# Patient Record
Sex: MALE | Race: WHITE | Employment: OTHER | URBAN - METROPOLITAN AREA
[De-identification: names, ages, dates, MRNs, and addresses within clinical notes are randomized per-mention and may not be internally consistent; named-entity substitution may affect disease eponyms.]

---

## 2017-01-20 ENCOUNTER — HOSPITAL ENCOUNTER (OUTPATIENT)
Dept: GENERAL RADIOLOGY | Age: 57
Discharge: HOME | End: 2017-01-20
Attending: INTERNAL MEDICINE | Admitting: INTERNAL MEDICINE

## 2017-01-20 ENCOUNTER — TELEPHONE (OUTPATIENT)
Dept: INTERNAL MEDICINE CLINIC | Age: 57
End: 2017-01-20

## 2017-01-20 DIAGNOSIS — R05.9 COUGH: Primary | ICD-10-CM

## 2017-01-20 DIAGNOSIS — R05.9 COUGH: ICD-10-CM

## 2017-01-23 ENCOUNTER — OFFICE VISIT (OUTPATIENT)
Dept: INTERNAL MEDICINE CLINIC | Age: 57
End: 2017-01-23

## 2017-01-23 VITALS
WEIGHT: 167 LBS | RESPIRATION RATE: 20 BRPM | SYSTOLIC BLOOD PRESSURE: 114 MMHG | BODY MASS INDEX: 28.67 KG/M2 | DIASTOLIC BLOOD PRESSURE: 64 MMHG | HEART RATE: 77 BPM | TEMPERATURE: 97.2 F | OXYGEN SATURATION: 97 %

## 2017-01-23 DIAGNOSIS — I71.21 ASCENDING AORTIC ANEURYSM: ICD-10-CM

## 2017-01-23 DIAGNOSIS — J20.9 ACUTE BRONCHITIS, UNSPECIFIED ORGANISM: Primary | ICD-10-CM

## 2017-01-23 PROCEDURE — 99213 OFFICE O/P EST LOW 20 MIN: CPT | Performed by: INTERNAL MEDICINE

## 2017-01-23 PROCEDURE — G8484 FLU IMMUNIZE NO ADMIN: HCPCS | Performed by: INTERNAL MEDICINE

## 2017-01-23 PROCEDURE — 3017F COLORECTAL CA SCREEN DOC REV: CPT | Performed by: INTERNAL MEDICINE

## 2017-01-23 PROCEDURE — 1036F TOBACCO NON-USER: CPT | Performed by: INTERNAL MEDICINE

## 2017-01-23 PROCEDURE — G8427 DOCREV CUR MEDS BY ELIG CLIN: HCPCS | Performed by: INTERNAL MEDICINE

## 2017-01-23 PROCEDURE — G8419 CALC BMI OUT NRM PARAM NOF/U: HCPCS | Performed by: INTERNAL MEDICINE

## 2017-01-23 RX ORDER — AZITHROMYCIN 250 MG/1
TABLET, FILM COATED ORAL
Qty: 1 PACKET | Refills: 0 | Status: SHIPPED | OUTPATIENT
Start: 2017-01-23 | End: 2017-02-08 | Stop reason: ALTCHOICE

## 2017-02-15 ENCOUNTER — HOSPITAL ENCOUNTER (OUTPATIENT)
Dept: LAB | Age: 57
Discharge: HOME | End: 2017-02-15
Attending: INTERNAL MEDICINE | Admitting: INTERNAL MEDICINE

## 2017-02-15 LAB
ALBUMIN SERPL-MCNC: 4.2 GM/DL (ref 3.4–5)
ALP BLD-CCNC: 85 IU/L (ref 40–129)
ALT SERPL-CCNC: 12 U/L (ref 10–40)
ANION GAP SERPL CALCULATED.3IONS-SCNC: 13 MMOL/L (ref 4–16)
AST SERPL-CCNC: 13 IU/L (ref 15–37)
BILIRUB SERPL-MCNC: 0.4 MG/DL (ref 0–1)
BUN BLDV-MCNC: 17 MG/DL (ref 6–23)
CALCIUM SERPL-MCNC: 9.4 MG/DL (ref 8.3–10.6)
CHLORIDE BLD-SCNC: 102 MMOL/L (ref 99–110)
CHOLESTEROL: 135 MG/DL
CO2: 26 MMOL/L (ref 21–32)
CREAT SERPL-MCNC: 1.1 MG/DL (ref 0.9–1.3)
ESTIMATED AVERAGE GLUCOSE: 126 MG/DL
GFR AFRICAN AMERICAN: >60 ML/MIN/1.73M2
GFR NON-AFRICAN AMERICAN: >60 ML/MIN/1.73M2
GLUCOSE FASTING: 101 MG/DL (ref 70–99)
HBA1C MFR BLD: 6 % (ref 4.2–6.3)
HDLC SERPL-MCNC: 29 MG/DL
LDL CHOLESTEROL DIRECT: 82 MG/DL
POTASSIUM SERPL-SCNC: 4.3 MMOL/L (ref 3.5–5.1)
SODIUM BLD-SCNC: 141 MMOL/L (ref 135–145)
TOTAL PROTEIN: 6.9 GM/DL (ref 6.4–8.2)
TRIGL SERPL-MCNC: 205 MG/DL

## 2017-02-27 ENCOUNTER — OFFICE VISIT (OUTPATIENT)
Dept: INTERNAL MEDICINE CLINIC | Age: 57
End: 2017-02-27

## 2017-02-27 VITALS
TEMPERATURE: 98.2 F | OXYGEN SATURATION: 98 % | BODY MASS INDEX: 27.79 KG/M2 | SYSTOLIC BLOOD PRESSURE: 106 MMHG | RESPIRATION RATE: 13 BRPM | HEART RATE: 64 BPM | HEIGHT: 64 IN | WEIGHT: 162.8 LBS | DIASTOLIC BLOOD PRESSURE: 62 MMHG

## 2017-02-27 DIAGNOSIS — I71.21 ASCENDING AORTIC ANEURYSM: ICD-10-CM

## 2017-02-27 DIAGNOSIS — K76.89 LIVER DYSFUNCTION: ICD-10-CM

## 2017-02-27 DIAGNOSIS — F41.9 ANXIETY: ICD-10-CM

## 2017-02-27 DIAGNOSIS — R91.8 MULTIPLE LUNG NODULES ON CT: ICD-10-CM

## 2017-02-27 DIAGNOSIS — E11.9 TYPE 2 DIABETES MELLITUS WITHOUT COMPLICATION, WITHOUT LONG-TERM CURRENT USE OF INSULIN (HCC): ICD-10-CM

## 2017-02-27 DIAGNOSIS — E78.2 MIXED HYPERLIPIDEMIA: ICD-10-CM

## 2017-02-27 DIAGNOSIS — M19.90 OSTEOARTHRITIS, UNSPECIFIED OSTEOARTHRITIS TYPE, UNSPECIFIED SITE: ICD-10-CM

## 2017-02-27 DIAGNOSIS — I10 ESSENTIAL HYPERTENSION: ICD-10-CM

## 2017-02-27 PROCEDURE — G8419 CALC BMI OUT NRM PARAM NOF/U: HCPCS | Performed by: INTERNAL MEDICINE

## 2017-02-27 PROCEDURE — 3017F COLORECTAL CA SCREEN DOC REV: CPT | Performed by: INTERNAL MEDICINE

## 2017-02-27 PROCEDURE — G8484 FLU IMMUNIZE NO ADMIN: HCPCS | Performed by: INTERNAL MEDICINE

## 2017-02-27 PROCEDURE — 99214 OFFICE O/P EST MOD 30 MIN: CPT | Performed by: INTERNAL MEDICINE

## 2017-02-27 PROCEDURE — G8427 DOCREV CUR MEDS BY ELIG CLIN: HCPCS | Performed by: INTERNAL MEDICINE

## 2017-02-27 PROCEDURE — 3044F HG A1C LEVEL LT 7.0%: CPT | Performed by: INTERNAL MEDICINE

## 2017-02-27 PROCEDURE — 1036F TOBACCO NON-USER: CPT | Performed by: INTERNAL MEDICINE

## 2017-02-27 RX ORDER — GLIPIZIDE 5 MG/1
5 TABLET, FILM COATED, EXTENDED RELEASE ORAL DAILY
Qty: 90 TABLET | Refills: 2 | Status: SHIPPED | OUTPATIENT
Start: 2017-02-27 | End: 2017-06-01 | Stop reason: ALTCHOICE

## 2017-02-27 RX ORDER — OMEPRAZOLE 20 MG/1
20 CAPSULE, DELAYED RELEASE ORAL DAILY
Qty: 90 CAPSULE | Refills: 1 | Status: SHIPPED | OUTPATIENT
Start: 2017-02-27 | End: 2017-06-01 | Stop reason: ALTCHOICE

## 2017-02-27 RX ORDER — CARVEDILOL 12.5 MG/1
12.5 TABLET ORAL 2 TIMES DAILY WITH MEALS
Qty: 180 TABLET | Refills: 1 | Status: SHIPPED | OUTPATIENT
Start: 2017-02-27 | End: 2017-06-01 | Stop reason: SDUPTHER

## 2017-02-27 RX ORDER — AMLODIPINE BESYLATE 10 MG/1
TABLET ORAL
Qty: 90 TABLET | Refills: 1 | Status: SHIPPED | OUTPATIENT
Start: 2017-02-27 | End: 2017-06-01 | Stop reason: SDUPTHER

## 2017-02-27 RX ORDER — BENAZEPRIL HYDROCHLORIDE 20 MG/1
TABLET ORAL
Qty: 90 TABLET | Refills: 1 | Status: SHIPPED | OUTPATIENT
Start: 2017-02-27 | End: 2017-06-01 | Stop reason: SDUPTHER

## 2017-02-27 RX ORDER — ATORVASTATIN CALCIUM 40 MG/1
40 TABLET, FILM COATED ORAL DAILY
Qty: 90 TABLET | Refills: 1 | Status: SHIPPED | OUTPATIENT
Start: 2017-02-27 | End: 2017-06-01 | Stop reason: SDUPTHER

## 2017-02-27 ASSESSMENT — ENCOUNTER SYMPTOMS
COUGH: 0
HEARTBURN: 0
VOMITING: 0
EYES NEGATIVE: 1
SHORTNESS OF BREATH: 0
BLOOD IN STOOL: 0
GASTROINTESTINAL NEGATIVE: 1
NAUSEA: 0
RESPIRATORY NEGATIVE: 1

## 2017-06-01 ENCOUNTER — OFFICE VISIT (OUTPATIENT)
Dept: INTERNAL MEDICINE CLINIC | Age: 57
End: 2017-06-01

## 2017-06-01 VITALS
HEART RATE: 60 BPM | RESPIRATION RATE: 12 BRPM | TEMPERATURE: 97.7 F | BODY MASS INDEX: 28.41 KG/M2 | HEIGHT: 64 IN | OXYGEN SATURATION: 98 % | SYSTOLIC BLOOD PRESSURE: 108 MMHG | DIASTOLIC BLOOD PRESSURE: 66 MMHG | WEIGHT: 166.4 LBS

## 2017-06-01 DIAGNOSIS — M19.90 OSTEOARTHRITIS, UNSPECIFIED OSTEOARTHRITIS TYPE, UNSPECIFIED SITE: ICD-10-CM

## 2017-06-01 DIAGNOSIS — E78.2 MIXED HYPERLIPIDEMIA: ICD-10-CM

## 2017-06-01 DIAGNOSIS — A48.1 LEGIONELLA PNEUMONIA (HCC): ICD-10-CM

## 2017-06-01 DIAGNOSIS — F41.9 ANXIETY: ICD-10-CM

## 2017-06-01 DIAGNOSIS — I10 ESSENTIAL HYPERTENSION: ICD-10-CM

## 2017-06-01 DIAGNOSIS — E11.9 TYPE 2 DIABETES MELLITUS WITHOUT COMPLICATION, WITHOUT LONG-TERM CURRENT USE OF INSULIN (HCC): Primary | ICD-10-CM

## 2017-06-01 DIAGNOSIS — I71.21 ASCENDING AORTIC ANEURYSM: ICD-10-CM

## 2017-06-01 DIAGNOSIS — R91.8 MULTIPLE LUNG NODULES ON CT: ICD-10-CM

## 2017-06-01 LAB — HBA1C MFR BLD: 5.7 %

## 2017-06-01 PROCEDURE — G8427 DOCREV CUR MEDS BY ELIG CLIN: HCPCS | Performed by: INTERNAL MEDICINE

## 2017-06-01 PROCEDURE — 99214 OFFICE O/P EST MOD 30 MIN: CPT | Performed by: INTERNAL MEDICINE

## 2017-06-01 PROCEDURE — 1036F TOBACCO NON-USER: CPT | Performed by: INTERNAL MEDICINE

## 2017-06-01 PROCEDURE — 83036 HEMOGLOBIN GLYCOSYLATED A1C: CPT | Performed by: INTERNAL MEDICINE

## 2017-06-01 PROCEDURE — 3044F HG A1C LEVEL LT 7.0%: CPT | Performed by: INTERNAL MEDICINE

## 2017-06-01 PROCEDURE — G8419 CALC BMI OUT NRM PARAM NOF/U: HCPCS | Performed by: INTERNAL MEDICINE

## 2017-06-01 PROCEDURE — 3017F COLORECTAL CA SCREEN DOC REV: CPT | Performed by: INTERNAL MEDICINE

## 2017-06-01 RX ORDER — OMEPRAZOLE 20 MG/1
20 CAPSULE, DELAYED RELEASE ORAL DAILY
Qty: 90 CAPSULE | Refills: 1 | Status: CANCELLED | OUTPATIENT
Start: 2017-06-01

## 2017-06-01 RX ORDER — ATORVASTATIN CALCIUM 40 MG/1
40 TABLET, FILM COATED ORAL DAILY
Qty: 90 TABLET | Refills: 1 | Status: SHIPPED | OUTPATIENT
Start: 2017-06-01 | End: 2017-09-13 | Stop reason: SDUPTHER

## 2017-06-01 RX ORDER — GLIPIZIDE 5 MG/1
5 TABLET, FILM COATED, EXTENDED RELEASE ORAL DAILY
Qty: 90 TABLET | Refills: 2 | Status: CANCELLED | OUTPATIENT
Start: 2017-06-01

## 2017-06-01 RX ORDER — BENAZEPRIL HYDROCHLORIDE 20 MG/1
TABLET ORAL
Qty: 90 TABLET | Refills: 1 | Status: SHIPPED | OUTPATIENT
Start: 2017-06-01 | End: 2017-09-13 | Stop reason: SDUPTHER

## 2017-06-01 RX ORDER — CARVEDILOL 12.5 MG/1
12.5 TABLET ORAL 2 TIMES DAILY WITH MEALS
Qty: 180 TABLET | Refills: 1 | Status: SHIPPED | OUTPATIENT
Start: 2017-06-01 | End: 2017-09-13 | Stop reason: SDUPTHER

## 2017-06-01 RX ORDER — AMLODIPINE BESYLATE 10 MG/1
TABLET ORAL
Qty: 90 TABLET | Refills: 1 | Status: SHIPPED | OUTPATIENT
Start: 2017-06-01 | End: 2017-09-13 | Stop reason: SDUPTHER

## 2017-06-01 ASSESSMENT — ENCOUNTER SYMPTOMS
GASTROINTESTINAL NEGATIVE: 1
SHORTNESS OF BREATH: 0
EYES NEGATIVE: 1
RESPIRATORY NEGATIVE: 1
COUGH: 0

## 2017-06-01 ASSESSMENT — PATIENT HEALTH QUESTIONNAIRE - PHQ9
SUM OF ALL RESPONSES TO PHQ9 QUESTIONS 1 & 2: 1
SUM OF ALL RESPONSES TO PHQ QUESTIONS 1-9: 1
2. FEELING DOWN, DEPRESSED OR HOPELESS: 0
1. LITTLE INTEREST OR PLEASURE IN DOING THINGS: 1

## 2017-09-06 ENCOUNTER — HOSPITAL ENCOUNTER (OUTPATIENT)
Dept: LAB | Age: 57
Discharge: HOME | End: 2017-09-06
Attending: INTERNAL MEDICINE | Admitting: INTERNAL MEDICINE

## 2017-09-06 LAB
ALBUMIN SERPL-MCNC: 4.5 GM/DL (ref 3.4–5)
ALP BLD-CCNC: 79 IU/L (ref 40–129)
ALT SERPL-CCNC: 10 U/L (ref 10–40)
ANION GAP SERPL CALCULATED.3IONS-SCNC: 13 MMOL/L (ref 4–16)
AST SERPL-CCNC: 13 IU/L (ref 15–37)
BILIRUB SERPL-MCNC: 0.5 MG/DL (ref 0–1)
BUN BLDV-MCNC: 17 MG/DL (ref 6–23)
CALCIUM SERPL-MCNC: 9.3 MG/DL (ref 8.3–10.6)
CHLORIDE BLD-SCNC: 102 MMOL/L (ref 99–110)
CHOLESTEROL, FASTING: 137 MG/DL
CO2: 28 MMOL/L (ref 21–32)
CREAT SERPL-MCNC: 1 MG/DL (ref 0.9–1.3)
CREATININE URINE: 160.5 MG/DL (ref 39–259)
GFR AFRICAN AMERICAN: >60 ML/MIN/1.73M2
GFR NON-AFRICAN AMERICAN: >60 ML/MIN/1.73M2
GLUCOSE FASTING: 134 MG/DL (ref 70–99)
HDLC SERPL-MCNC: 32 MG/DL
LDL CHOLESTEROL DIRECT: 85 MG/DL
MICROALBUMIN/CREAT 24H UR: 1.3 MG/DL
MICROALBUMIN/CREAT UR-RTO: 8.1 MG/G CREAT (ref 0–30)
POTASSIUM SERPL-SCNC: 4.5 MMOL/L (ref 3.5–5.1)
SODIUM BLD-SCNC: 143 MMOL/L (ref 135–145)
TOTAL PROTEIN: 7.3 GM/DL (ref 6.4–8.2)
TRIGLYCERIDE, FASTING: 116 MG/DL

## 2017-09-13 ENCOUNTER — OFFICE VISIT (OUTPATIENT)
Dept: INTERNAL MEDICINE CLINIC | Age: 57
End: 2017-09-13

## 2017-09-13 VITALS
TEMPERATURE: 98 F | BODY MASS INDEX: 28.07 KG/M2 | HEART RATE: 72 BPM | SYSTOLIC BLOOD PRESSURE: 112 MMHG | HEIGHT: 64 IN | OXYGEN SATURATION: 98 % | WEIGHT: 164.4 LBS | DIASTOLIC BLOOD PRESSURE: 62 MMHG | RESPIRATION RATE: 16 BRPM

## 2017-09-13 DIAGNOSIS — E78.2 MIXED HYPERLIPIDEMIA: ICD-10-CM

## 2017-09-13 DIAGNOSIS — I10 ESSENTIAL HYPERTENSION: ICD-10-CM

## 2017-09-13 DIAGNOSIS — M19.90 OSTEOARTHRITIS, UNSPECIFIED OSTEOARTHRITIS TYPE, UNSPECIFIED SITE: ICD-10-CM

## 2017-09-13 DIAGNOSIS — E11.9 TYPE 2 DIABETES MELLITUS WITHOUT COMPLICATION, WITHOUT LONG-TERM CURRENT USE OF INSULIN (HCC): ICD-10-CM

## 2017-09-13 DIAGNOSIS — R91.8 MULTIPLE LUNG NODULES ON CT: ICD-10-CM

## 2017-09-13 DIAGNOSIS — Z00.00 HEALTHCARE MAINTENANCE: ICD-10-CM

## 2017-09-13 DIAGNOSIS — F41.9 ANXIETY: ICD-10-CM

## 2017-09-13 DIAGNOSIS — I71.21 ASCENDING AORTIC ANEURYSM: ICD-10-CM

## 2017-09-13 PROCEDURE — G8427 DOCREV CUR MEDS BY ELIG CLIN: HCPCS | Performed by: INTERNAL MEDICINE

## 2017-09-13 PROCEDURE — 3046F HEMOGLOBIN A1C LEVEL >9.0%: CPT | Performed by: INTERNAL MEDICINE

## 2017-09-13 PROCEDURE — G8417 CALC BMI ABV UP PARAM F/U: HCPCS | Performed by: INTERNAL MEDICINE

## 2017-09-13 PROCEDURE — 3017F COLORECTAL CA SCREEN DOC REV: CPT | Performed by: INTERNAL MEDICINE

## 2017-09-13 PROCEDURE — 99214 OFFICE O/P EST MOD 30 MIN: CPT | Performed by: INTERNAL MEDICINE

## 2017-09-13 PROCEDURE — 1036F TOBACCO NON-USER: CPT | Performed by: INTERNAL MEDICINE

## 2017-09-13 RX ORDER — ATORVASTATIN CALCIUM 40 MG/1
40 TABLET, FILM COATED ORAL DAILY
Qty: 90 TABLET | Refills: 1 | Status: SHIPPED | OUTPATIENT
Start: 2017-09-13 | End: 2018-07-23 | Stop reason: SDUPTHER

## 2017-09-13 RX ORDER — BENAZEPRIL HYDROCHLORIDE 20 MG/1
TABLET ORAL
Qty: 90 TABLET | Refills: 1 | Status: SHIPPED | OUTPATIENT
Start: 2017-09-13 | End: 2018-07-23 | Stop reason: SDUPTHER

## 2017-09-13 RX ORDER — AMLODIPINE BESYLATE 10 MG/1
TABLET ORAL
Qty: 90 TABLET | Refills: 1 | Status: SHIPPED | OUTPATIENT
Start: 2017-09-13 | End: 2018-07-23 | Stop reason: SDUPTHER

## 2017-09-13 RX ORDER — CARVEDILOL 12.5 MG/1
12.5 TABLET ORAL 2 TIMES DAILY WITH MEALS
Qty: 180 TABLET | Refills: 1 | Status: SHIPPED | OUTPATIENT
Start: 2017-09-13 | End: 2017-11-27 | Stop reason: SDUPTHER

## 2017-09-13 RX ORDER — GLUCOSAMINE HCL/CHONDROITIN SU 500-400 MG
CAPSULE ORAL
Qty: 100 STRIP | Refills: 1 | Status: SHIPPED | OUTPATIENT
Start: 2017-09-13 | End: 2018-09-26 | Stop reason: SDUPTHER

## 2017-09-13 ASSESSMENT — ENCOUNTER SYMPTOMS
RESPIRATORY NEGATIVE: 1
SPUTUM PRODUCTION: 0
GASTROINTESTINAL NEGATIVE: 1
COUGH: 0
HEMOPTYSIS: 0

## 2017-09-16 ENCOUNTER — HOSPITAL ENCOUNTER (OUTPATIENT)
Dept: CT IMAGING | Age: 57
Discharge: HOME | End: 2017-09-16
Attending: INTERNAL MEDICINE | Admitting: INTERNAL MEDICINE

## 2017-09-16 DIAGNOSIS — R91.8 MULTIPLE LUNG NODULES: ICD-10-CM

## 2017-09-16 DIAGNOSIS — I71.20 THORACIC AORTIC ANEURYSM WITHOUT RUPTURE: ICD-10-CM

## 2017-09-21 ENCOUNTER — NURSE ONLY (OUTPATIENT)
Dept: INTERNAL MEDICINE CLINIC | Age: 57
End: 2017-09-21

## 2017-09-21 DIAGNOSIS — Z00.00 HEALTHCARE MAINTENANCE: Primary | ICD-10-CM

## 2017-09-21 LAB
CONTROL: NORMAL
HEMOCCULT STL QL: NEGATIVE

## 2017-09-21 PROCEDURE — 82274 ASSAY TEST FOR BLOOD FECAL: CPT | Performed by: INTERNAL MEDICINE

## 2017-11-27 RX ORDER — CARVEDILOL 12.5 MG/1
TABLET ORAL
Qty: 180 TABLET | Refills: 1 | Status: SHIPPED | OUTPATIENT
Start: 2017-11-27 | End: 2018-07-23 | Stop reason: SDUPTHER

## 2017-12-14 ENCOUNTER — OFFICE VISIT (OUTPATIENT)
Dept: INTERNAL MEDICINE CLINIC | Age: 57
End: 2017-12-14

## 2017-12-14 VITALS
OXYGEN SATURATION: 98 % | RESPIRATION RATE: 12 BRPM | DIASTOLIC BLOOD PRESSURE: 72 MMHG | HEART RATE: 60 BPM | SYSTOLIC BLOOD PRESSURE: 122 MMHG | WEIGHT: 166 LBS | BODY MASS INDEX: 28.49 KG/M2 | TEMPERATURE: 97.2 F

## 2017-12-14 DIAGNOSIS — I10 ESSENTIAL HYPERTENSION: ICD-10-CM

## 2017-12-14 DIAGNOSIS — E78.2 MIXED HYPERLIPIDEMIA: ICD-10-CM

## 2017-12-14 DIAGNOSIS — M19.90 OSTEOARTHRITIS, UNSPECIFIED OSTEOARTHRITIS TYPE, UNSPECIFIED SITE: ICD-10-CM

## 2017-12-14 DIAGNOSIS — R91.8 MULTIPLE LUNG NODULES ON CT: ICD-10-CM

## 2017-12-14 DIAGNOSIS — I71.21 ASCENDING AORTIC ANEURYSM: ICD-10-CM

## 2017-12-14 DIAGNOSIS — F41.9 ANXIETY: ICD-10-CM

## 2017-12-14 DIAGNOSIS — Z00.00 HEALTHCARE MAINTENANCE: ICD-10-CM

## 2017-12-14 DIAGNOSIS — E11.9 TYPE 2 DIABETES MELLITUS WITHOUT COMPLICATION, WITHOUT LONG-TERM CURRENT USE OF INSULIN (HCC): Primary | ICD-10-CM

## 2017-12-14 LAB — HBA1C MFR BLD: 6.1 %

## 2017-12-14 PROCEDURE — 83036 HEMOGLOBIN GLYCOSYLATED A1C: CPT | Performed by: INTERNAL MEDICINE

## 2017-12-14 PROCEDURE — 99214 OFFICE O/P EST MOD 30 MIN: CPT | Performed by: INTERNAL MEDICINE

## 2017-12-14 ASSESSMENT — ENCOUNTER SYMPTOMS
DOUBLE VISION: 0
HEARTBURN: 0
EYES NEGATIVE: 1
GASTROINTESTINAL NEGATIVE: 1
BLURRED VISION: 0
RESPIRATORY NEGATIVE: 1
NAUSEA: 0

## 2017-12-14 NOTE — ASSESSMENT & PLAN NOTE
Small 2-4 mm nodules on CT in 10/16. F/u in one year. Seen Dr Nima Gomez. F/u 9/2017 : stable nodules. Benign based on stability criteria. No f/u needed  Does not want to see Dr Nima Gomez because of insurance problems.

## 2017-12-14 NOTE — ASSESSMENT & PLAN NOTE
Hypertension in control  Patient is on amlodipine, benazepril and carvedilol  Patient is stable. Continue current treatment.

## 2017-12-14 NOTE — PROGRESS NOTES
 Efudex [Fluorouracil]      Current Outpatient Prescriptions   Medication Sig Dispense Refill    carvedilol (COREG) 12.5 MG tablet TAKE ONE TABLET BY MOUTH TWICE DAILY with meals 180 tablet 1    amLODIPine (NORVASC) 10 MG tablet TAKE 1 TABLET BY MOUTH EVERY DAY 90 tablet 1    benazepril (LOTENSIN) 20 MG tablet TAKE 1 TABLET BY MOUTH EVERY DAY 90 tablet 1    sitaGLIPtan-metformin (JANUMET)  MG per tablet Take 1 tablet by mouth 2 times daily (with meals) 180 tablet 1    atorvastatin (LIPITOR) 40 MG tablet Take 1 tablet by mouth daily 90 tablet 1    ONE TOUCH LANCETS MISC Testing once daily, DX=E11.9 100 each 1    Glucose Blood (BLOOD GLUCOSE TEST STRIPS) STRP Please dispense One Touch Test strips, testing once daily, DX=E11.9 100 strip 1    Dextrose, Diabetic Use, (GLUTOSE 15 PO) Take 1 tablet by mouth as needed Chewable fruit flavored unsure of dosing      aspirin 81 MG tablet Take 81 mg by mouth daily. No current facility-administered medications for this visit. Past Medical History:   Diagnosis Date    Adhesive capsulitis of shoulder 4/23/2011    Anxiety     h/o anxiety workman comp related. sees a psychologist    Ascending aortic aneurysm ([facility] [address] 9/9/2016 8/16: CT chest :Mild aneurysmal dilation of the ascending aorta measuring up to 4.1 cm. Recheck in one year    Backache, unspecified 7/24/2013    Sees dr. Gerry Schmitz    Chronic back pain     Colonoscopy refused     Depression     Diabetes mellitus ([facility] [address]     ED (erectile dysfunction) 1/25/2014    H/O CHF 2003    resolved  EF 25-30 % improved 60% in 3/05    H/O echocardiogram 2011    EF 60 %    Hyperlipidemia     Hypertension     Legionella pneumonia ([facility] 10/27/2016    10/16. Resolved. Seen Shirley Ribeiro.  Limitation due to disability     due to anxiety and agrophobia    Liver dysfunction     blood w/u neg.  US fatty liver    Liver dysfunction 10/27/2016    Liver test were abnormal with legionelle pneumonia Now

## 2017-12-14 NOTE — ASSESSMENT & PLAN NOTE
Pt has DM since ? 2007  Sees Ophthalmologist once a year.  Dr Martinez Guru  change to Janumet 50/1000 mg bid  BS are good at home

## 2017-12-14 NOTE — ASSESSMENT & PLAN NOTE
8/16: CT chest :Mild aneurysmal dilation of the ascending aorta measuring up to 4.1 cm.  9/16: stable.  Ectasia 4.1 cm  Recheck in one year

## 2018-03-07 ENCOUNTER — HOSPITAL ENCOUNTER (OUTPATIENT)
Dept: LAB | Age: 58
Discharge: HOME | End: 2018-03-07
Attending: INTERNAL MEDICINE | Admitting: INTERNAL MEDICINE

## 2018-03-07 LAB
ALBUMIN SERPL-MCNC: 4.7 GM/DL (ref 3.4–5)
ALP BLD-CCNC: 74 IU/L (ref 40–129)
ALT SERPL-CCNC: 22 U/L (ref 10–40)
ANION GAP SERPL CALCULATED.3IONS-SCNC: 11 MMOL/L (ref 4–16)
AST SERPL-CCNC: 15 IU/L (ref 15–37)
BASOPHILS ABSOLUTE: 0 K/CU MM
BASOPHILS RELATIVE PERCENT: 0.6 % (ref 0–1)
BILIRUB SERPL-MCNC: 0.5 MG/DL (ref 0–1)
BUN BLDV-MCNC: 14 MG/DL (ref 6–23)
CALCIUM SERPL-MCNC: 9.9 MG/DL (ref 8.3–10.6)
CHLORIDE BLD-SCNC: 100 MMOL/L (ref 99–110)
CHOLESTEROL, FASTING: 176 MG/DL
CO2: 30 MMOL/L (ref 21–32)
CREAT SERPL-MCNC: 1 MG/DL (ref 0.9–1.3)
DIFFERENTIAL TYPE: ABNORMAL
EOSINOPHILS ABSOLUTE: 0.2 K/CU MM
EOSINOPHILS RELATIVE PERCENT: 3.4 % (ref 0–3)
GFR AFRICAN AMERICAN: >60 ML/MIN/1.73M2
GFR NON-AFRICAN AMERICAN: >60 ML/MIN/1.73M2
GLUCOSE FASTING: 177 MG/DL (ref 70–99)
HCT VFR BLD CALC: 45.1 % (ref 42–52)
HDLC SERPL-MCNC: 39 MG/DL
HEMOGLOBIN: 14.9 GM/DL (ref 13.5–18)
IMMATURE NEUTROPHIL %: 0.3 % (ref 0–0.43)
LDL CHOLESTEROL DIRECT: 107 MG/DL
LYMPHOCYTES ABSOLUTE: 1.6 K/CU MM
LYMPHOCYTES RELATIVE PERCENT: 23.2 % (ref 24–44)
MCH RBC QN AUTO: 30 PG (ref 27–31)
MCHC RBC AUTO-ENTMCNC: 33 % (ref 32–36)
MCV RBC AUTO: 90.9 FL (ref 78–100)
MONOCYTES ABSOLUTE: 0.7 K/CU MM
MONOCYTES RELATIVE PERCENT: 10.2 % (ref 0–4)
PDW BLD-RTO: 12.7 % (ref 11.7–14.9)
PLATELET # BLD: 311 K/CU MM (ref 140–440)
PMV BLD AUTO: 10.6 FL (ref 7.5–11.1)
POTASSIUM SERPL-SCNC: 5.3 MMOL/L (ref 3.5–5.1)
RBC # BLD: 4.96 M/CU MM (ref 4.6–6.2)
SEGMENTED NEUTROPHILS ABSOLUTE COUNT: 4.4 K/CU MM
SEGMENTED NEUTROPHILS RELATIVE PERCENT: 62.3 % (ref 36–66)
SODIUM BLD-SCNC: 141 MMOL/L (ref 135–145)
TOTAL IMMATURE NEUTOROPHIL: 0.02 K/CU MM
TOTAL PROTEIN: 7.7 GM/DL (ref 6.4–8.2)
TRIGLYCERIDE, FASTING: 205 MG/DL
WBC # BLD: 7 K/CU MM (ref 4–10.5)

## 2018-03-15 ENCOUNTER — OFFICE VISIT (OUTPATIENT)
Dept: INTERNAL MEDICINE CLINIC | Age: 58
End: 2018-03-15

## 2018-03-15 VITALS
BODY MASS INDEX: 28.58 KG/M2 | DIASTOLIC BLOOD PRESSURE: 80 MMHG | HEART RATE: 72 BPM | SYSTOLIC BLOOD PRESSURE: 128 MMHG | RESPIRATION RATE: 12 BRPM | OXYGEN SATURATION: 96 % | HEIGHT: 64 IN | WEIGHT: 167.4 LBS | TEMPERATURE: 97.7 F

## 2018-03-15 DIAGNOSIS — E78.2 MIXED HYPERLIPIDEMIA: ICD-10-CM

## 2018-03-15 DIAGNOSIS — I10 ESSENTIAL HYPERTENSION: ICD-10-CM

## 2018-03-15 DIAGNOSIS — E11.9 TYPE 2 DIABETES MELLITUS WITHOUT COMPLICATION, WITHOUT LONG-TERM CURRENT USE OF INSULIN (HCC): Primary | ICD-10-CM

## 2018-03-15 DIAGNOSIS — I71.21 ASCENDING AORTIC ANEURYSM: ICD-10-CM

## 2018-03-15 DIAGNOSIS — R91.8 MULTIPLE LUNG NODULES ON CT: ICD-10-CM

## 2018-03-15 DIAGNOSIS — F41.9 ANXIETY: ICD-10-CM

## 2018-03-15 DIAGNOSIS — Z00.00 HEALTHCARE MAINTENANCE: ICD-10-CM

## 2018-03-15 DIAGNOSIS — M19.90 OSTEOARTHRITIS, UNSPECIFIED OSTEOARTHRITIS TYPE, UNSPECIFIED SITE: ICD-10-CM

## 2018-03-15 LAB — HBA1C MFR BLD: 6 %

## 2018-03-15 PROCEDURE — 3017F COLORECTAL CA SCREEN DOC REV: CPT | Performed by: INTERNAL MEDICINE

## 2018-03-15 PROCEDURE — 1036F TOBACCO NON-USER: CPT | Performed by: INTERNAL MEDICINE

## 2018-03-15 PROCEDURE — G8427 DOCREV CUR MEDS BY ELIG CLIN: HCPCS | Performed by: INTERNAL MEDICINE

## 2018-03-15 PROCEDURE — G8484 FLU IMMUNIZE NO ADMIN: HCPCS | Performed by: INTERNAL MEDICINE

## 2018-03-15 PROCEDURE — G8419 CALC BMI OUT NRM PARAM NOF/U: HCPCS | Performed by: INTERNAL MEDICINE

## 2018-03-15 PROCEDURE — 3044F HG A1C LEVEL LT 7.0%: CPT | Performed by: INTERNAL MEDICINE

## 2018-03-15 PROCEDURE — 83036 HEMOGLOBIN GLYCOSYLATED A1C: CPT | Performed by: INTERNAL MEDICINE

## 2018-03-15 PROCEDURE — 99213 OFFICE O/P EST LOW 20 MIN: CPT | Performed by: INTERNAL MEDICINE

## 2018-03-15 ASSESSMENT — ENCOUNTER SYMPTOMS
RESPIRATORY NEGATIVE: 1
EYES NEGATIVE: 1
GASTROINTESTINAL NEGATIVE: 1

## 2018-03-15 NOTE — PROGRESS NOTES
Ilda Rawls  Patient's  is 1960  Seen in office on 3/15/2018      SUBJECTIVE:  Man Casey is a 61 y. o.year old male presents today   Chief Complaint   Patient presents with    3 Month Follow-Up     HTN    Diabetes    Discuss Labs     Lipid,CMP,CBC     Patient is here for follow-up of hypertension, diabetes and hyperlipidemia  He is doing well. Has no complaints  His blood sugars are running well at home. No hypoglycemia. Taking medications as directed. Denies any neuropathy. No blurred vision or double vision. Patient has hypertension and is in control. No chest pain or shortness of breath. No palpitations  His anxiety is stable. Patient has hyperlipidemia. Taking medications. No abdominal pain, no nausea or vomiting. No myalgias. Patient had labs done the results were reviewed with the patient in detail. His blood sugar was 177. Potassium is 5.3  Taking medications regularly. No side effects noted. Review of Systems   Constitutional: Negative. Eyes: Negative. Respiratory: Negative. Cardiovascular: Negative. Gastrointestinal: Negative. Genitourinary: Negative. Musculoskeletal: Negative. Skin: Negative. Neurological: Negative. Endo/Heme/Allergies: Negative. Psychiatric/Behavioral: The patient is nervous/anxious. OBJECTIVE: /80   Pulse 72   Temp 97.7 °F (36.5 °C) (Oral)   Resp 12   Ht 5' 4\" (1.626 m)   Wt 167 lb 6.4 oz (75.9 kg)   SpO2 96%   BMI 28.73 kg/m²     Wt Readings from Last 3 Encounters:   03/15/18 167 lb 6.4 oz (75.9 kg)   17 166 lb (75.3 kg)   17 164 lb 6.4 oz (74.6 kg)      GENERAL:  Alert, oriented, pleasant, in no apparent distress. HEENT:  Conjunctiva pink, no scleral icterus. ENT clear. NECK:  Supple. No jugular venous distention noted. No masses felt,  CARDIOVASCULAR:  Normal S1 and S2    PULMONARY:  No respiratory distress. No wheezes or rales.     ABDOMEN:  Soft and non-tender,no masses  or also  Refused colonoscopy because of anxiety. FIT test was normal     Return to office in 3 months. Orders Placed This Encounter   Procedures    Basic Metabolic Panel    POCT glycosylated hemoglobin (Hb A1C)         Mediations reviewed with the patient. Continue current medications. Appropriate prescriptions are addressed. After visit blakey provided. Follow up as directed sooner if needed. Questions answered and patient verbalizes understanding. Call for any problems, questions, or concerns. Allergies   Allergen Reactions    Etodolac Other (See Comments)     Drop in bp     Efudex [Fluorouracil]      Current Outpatient Prescriptions   Medication Sig Dispense Refill    carvedilol (COREG) 12.5 MG tablet TAKE ONE TABLET BY MOUTH TWICE DAILY with meals 180 tablet 1    amLODIPine (NORVASC) 10 MG tablet TAKE 1 TABLET BY MOUTH EVERY DAY 90 tablet 1    benazepril (LOTENSIN) 20 MG tablet TAKE 1 TABLET BY MOUTH EVERY DAY 90 tablet 1    sitaGLIPtan-metformin (JANUMET)  MG per tablet Take 1 tablet by mouth 2 times daily (with meals) 180 tablet 1    atorvastatin (LIPITOR) 40 MG tablet Take 1 tablet by mouth daily 90 tablet 1    ONE TOUCH LANCETS MISC Testing once daily, DX=E11.9 100 each 1    Glucose Blood (BLOOD GLUCOSE TEST STRIPS) STRP Please dispense One Touch Test strips, testing once daily, DX=E11.9 100 strip 1    Dextrose, Diabetic Use, (GLUTOSE 15 PO) Take 1 tablet by mouth as needed Chewable fruit flavored unsure of dosing      aspirin 81 MG tablet Take 81 mg by mouth daily. No current facility-administered medications for this visit. Past Medical History:   Diagnosis Date    Adhesive capsulitis of shoulder 4/23/2011    Anxiety     h/o anxiety workman comp related. sees a psychologist    Ascending aortic aneurysm ([facility] 9/9/2016 8/16: CT chest :Mild aneurysmal dilation of the ascending aorta measuring up to 4.1 cm.  Recheck in one year    Backache, unspecified 7/24/2013

## 2018-03-29 ENCOUNTER — OFFICE VISIT (OUTPATIENT)
Dept: FAMILY MEDICINE CLINIC | Age: 58
End: 2018-03-29

## 2018-03-29 ENCOUNTER — HOSPITAL ENCOUNTER (OUTPATIENT)
Dept: GENERAL RADIOLOGY | Age: 58
Discharge: HOME | End: 2018-03-29
Attending: NURSE PRACTITIONER | Admitting: NURSE PRACTITIONER

## 2018-03-29 VITALS
WEIGHT: 168.6 LBS | DIASTOLIC BLOOD PRESSURE: 64 MMHG | OXYGEN SATURATION: 96 % | HEIGHT: 63 IN | HEART RATE: 77 BPM | TEMPERATURE: 98 F | BODY MASS INDEX: 29.88 KG/M2 | SYSTOLIC BLOOD PRESSURE: 118 MMHG

## 2018-03-29 DIAGNOSIS — M54.6 ACUTE MIDLINE THORACIC BACK PAIN: Primary | ICD-10-CM

## 2018-03-29 DIAGNOSIS — M54.6 ACUTE MIDLINE THORACIC BACK PAIN: ICD-10-CM

## 2018-03-29 PROCEDURE — 99213 OFFICE O/P EST LOW 20 MIN: CPT | Performed by: NURSE PRACTITIONER

## 2018-03-29 ASSESSMENT — ENCOUNTER SYMPTOMS
BOWEL INCONTINENCE: 0
ABDOMINAL PAIN: 0
NAUSEA: 0
DIARRHEA: 0
BACK PAIN: 1
VOMITING: 0
SHORTNESS OF BREATH: 0
CHEST TIGHTNESS: 0
COUGH: 0

## 2018-03-29 NOTE — PROGRESS NOTES
Marisa Falcon   61 y.o.  male  [id number]      Chief Complaint   Patient presents with    Back Pain     comes and goes, has gotten worse in the last 2 weeks, sometimes it feels like it is right on the spine        Subjective:  61 y.o.male is here for a follow up. He has the following chronic/acute medical problems:   Patient Active Problem List   Diagnosis    Type 2 diabetes mellitus without complication ([facility]    Essential hypertension    Anxiety    Mixed hyperlipidemia    Osteoarthritis    Ascending aortic aneurysm (HCC)    Multiple lung nodules on CT    Healthcare maintenance     Back Pain   This is a new problem. The current episode started 1 to 4 weeks ago (3 weeks ago). The problem occurs constantly. The problem has been gradually worsening since onset. The pain is present in the thoracic spine. The quality of the pain is described as aching and shooting. The pain is at a severity of 7/10. The pain is the same all the time. Exacerbated by: movement. Stiffness is present all day. Pertinent negatives include no abdominal pain, bladder incontinence, bowel incontinence, chest pain, fever, headaches, leg pain or weakness. Risk factors include lack of exercise and sedentary lifestyle. He has tried nothing for the symptoms. Review of Systems   Constitutional: Negative for appetite change, chills, fatigue and fever. HENT: Negative. Respiratory: Negative for cough, chest tightness and shortness of breath. Cardiovascular: Negative for chest pain and palpitations. Gastrointestinal: Negative for abdominal pain, bowel incontinence, diarrhea, nausea and vomiting. Genitourinary: Negative for bladder incontinence. Musculoskeletal: Positive for back pain. Skin: Negative for rash. Neurological: Negative for dizziness, weakness, light-headedness and headaches.        Current Outpatient Prescriptions   Medication Sig Dispense Refill    carvedilol (COREG) 12.5 MG tablet TAKE ONE TABLET BY MOUTH

## 2018-03-29 NOTE — PATIENT INSTRUCTIONS
Patient Education        Back Pain: Care Instructions  Your Care Instructions    Back pain has many possible causes. It is often related to problems with muscles and ligaments of the back. It may also be related to problems with the nerves, discs, or bones of the back. Moving, lifting, standing, sitting, or sleeping in an awkward way can strain the back. Sometimes you don't notice the injury until later. Arthritis is another common cause of back pain. Although it may hurt a lot, back pain usually improves on its own within several weeks. Most people recover in 12 weeks or less. Using good home treatment and being careful not to stress your back can help you feel better sooner. Follow-up care is a key part of your treatment and safety. Be sure to make and go to all appointments, and call your doctor if you are having problems. It's also a good idea to know your test results and keep a list of the medicines you take. How can you care for yourself at home? · Sit or lie in positions that are most comfortable and reduce your pain. Try one of these positions when you lie down:  ¨ Lie on your back with your knees bent and supported by large pillows. ¨ Lie on the floor with your legs on the seat of a sofa or chair. Loren Pablo on your side with your knees and hips bent and a pillow between your legs. ¨ Lie on your stomach if it does not make pain worse. · Do not sit up in bed, and avoid soft couches and twisted positions. Bed rest can help relieve pain at first, but it delays healing. Avoid bed rest after the first day of back pain. · Change positions every 30 minutes. If you must sit for long periods of time, take breaks from sitting. Get up and walk around, or lie in a comfortable position. · Try using a heating pad on a low or medium setting for 15 to 20 minutes every 2 or 3 hours. Try a warm shower in place of one session with the heating pad.   · You can also try an ice pack for 10 to 15 minutes every 2 to 3 possible. If you receive a survey after visiting one of our offices, please take time to share your experience concerning your physician office visit. These surveys are confidential and no health information about you is shared. We are eager to improve for you and we are counting on your feedback to help make that happen.

## 2018-03-30 DIAGNOSIS — M54.6 ACUTE MIDLINE THORACIC BACK PAIN: Primary | ICD-10-CM

## 2018-04-11 ENCOUNTER — HOSPITAL ENCOUNTER (OUTPATIENT)
Dept: PHYSICAL THERAPY | Age: 58
Discharge: HOME | End: 2018-04-30
Attending: NURSE PRACTITIONER | Admitting: NURSE PRACTITIONER

## 2018-04-11 ASSESSMENT — PAIN DESCRIPTION - ORIENTATION: ORIENTATION: RIGHT;LEFT;POSTERIOR

## 2018-04-11 ASSESSMENT — PAIN SCALES - GENERAL: PAINLEVEL_OUTOF10: 5

## 2018-04-11 ASSESSMENT — PAIN DESCRIPTION - LOCATION: LOCATION: BACK

## 2018-04-11 ASSESSMENT — PAIN DESCRIPTION - FREQUENCY: FREQUENCY: CONTINUOUS

## 2018-04-11 ASSESSMENT — PAIN DESCRIPTION - PAIN TYPE: TYPE: CHRONIC PAIN

## 2018-04-12 PROBLEM — Z00.00 HEALTHCARE MAINTENANCE: Status: RESOLVED | Noted: 2017-09-13 | Resolved: 2018-04-12

## 2018-04-20 ENCOUNTER — TELEPHONE (OUTPATIENT)
Dept: INTERNAL MEDICINE CLINIC | Age: 58
End: 2018-04-20

## 2018-05-01 ENCOUNTER — HOSPITAL ENCOUNTER (OUTPATIENT)
Dept: OTHER | Age: 58
Discharge: HOME | End: 2018-05-31
Attending: NURSE PRACTITIONER | Admitting: NURSE PRACTITIONER

## 2018-06-20 ENCOUNTER — HOSPITAL ENCOUNTER (OUTPATIENT)
Dept: LAB | Age: 58
Discharge: HOME | End: 2018-06-20
Attending: INTERNAL MEDICINE | Admitting: INTERNAL MEDICINE

## 2018-06-20 ENCOUNTER — OFFICE VISIT (OUTPATIENT)
Dept: INTERNAL MEDICINE CLINIC | Age: 58
End: 2018-06-20

## 2018-06-20 VITALS
OXYGEN SATURATION: 96 % | RESPIRATION RATE: 16 BRPM | HEART RATE: 64 BPM | BODY MASS INDEX: 29.88 KG/M2 | TEMPERATURE: 97.5 F | WEIGHT: 168.6 LBS | HEIGHT: 63 IN | SYSTOLIC BLOOD PRESSURE: 128 MMHG | DIASTOLIC BLOOD PRESSURE: 70 MMHG

## 2018-06-20 DIAGNOSIS — E78.2 MIXED HYPERLIPIDEMIA: ICD-10-CM

## 2018-06-20 DIAGNOSIS — I71.21 ASCENDING AORTIC ANEURYSM: ICD-10-CM

## 2018-06-20 DIAGNOSIS — M19.90 OSTEOARTHRITIS, UNSPECIFIED OSTEOARTHRITIS TYPE, UNSPECIFIED SITE: ICD-10-CM

## 2018-06-20 DIAGNOSIS — E11.9 TYPE 2 DIABETES MELLITUS WITHOUT COMPLICATION, WITHOUT LONG-TERM CURRENT USE OF INSULIN (HCC): ICD-10-CM

## 2018-06-20 DIAGNOSIS — I10 ESSENTIAL HYPERTENSION: Primary | ICD-10-CM

## 2018-06-20 DIAGNOSIS — F41.9 ANXIETY: ICD-10-CM

## 2018-06-20 LAB
ANION GAP SERPL CALCULATED.3IONS-SCNC: 11 MMOL/L (ref 4–16)
BUN BLDV-MCNC: 14 MG/DL (ref 6–23)
CALCIUM SERPL-MCNC: 9.7 MG/DL (ref 8.3–10.6)
CHLORIDE BLD-SCNC: 100 MMOL/L (ref 99–110)
CO2: 31 MMOL/L (ref 21–32)
CREAT SERPL-MCNC: 1.1 MG/DL (ref 0.9–1.3)
GFR AFRICAN AMERICAN: >60 ML/MIN/1.73M2
GFR NON-AFRICAN AMERICAN: >60 ML/MIN/1.73M2
GLUCOSE BLD-MCNC: 155 MG/DL (ref 70–99)
POTASSIUM SERPL-SCNC: 5.2 MMOL/L (ref 3.5–5.1)
SODIUM BLD-SCNC: 142 MMOL/L (ref 135–145)

## 2018-06-20 PROCEDURE — 99213 OFFICE O/P EST LOW 20 MIN: CPT | Performed by: INTERNAL MEDICINE

## 2018-06-20 ASSESSMENT — ENCOUNTER SYMPTOMS
PHOTOPHOBIA: 0
EYES NEGATIVE: 1
DOUBLE VISION: 0
BLURRED VISION: 0
GASTROINTESTINAL NEGATIVE: 1
COUGH: 0
RESPIRATORY NEGATIVE: 1

## 2018-06-20 ASSESSMENT — PATIENT HEALTH QUESTIONNAIRE - PHQ9
SUM OF ALL RESPONSES TO PHQ9 QUESTIONS 1 & 2: 2
1. LITTLE INTEREST OR PLEASURE IN DOING THINGS: 1
2. FEELING DOWN, DEPRESSED OR HOPELESS: 1
SUM OF ALL RESPONSES TO PHQ QUESTIONS 1-9: 2

## 2018-07-23 RX ORDER — CARVEDILOL 12.5 MG/1
TABLET ORAL
Qty: 180 TABLET | Refills: 1 | Status: SHIPPED | OUTPATIENT
Start: 2018-07-23 | End: 2019-01-15 | Stop reason: SDUPTHER

## 2018-07-23 RX ORDER — BENAZEPRIL HYDROCHLORIDE 20 MG/1
TABLET ORAL
Qty: 90 TABLET | Refills: 1 | Status: SHIPPED | OUTPATIENT
Start: 2018-07-23 | End: 2018-10-22 | Stop reason: SDUPTHER

## 2018-07-23 RX ORDER — ATORVASTATIN CALCIUM 40 MG/1
40 TABLET, FILM COATED ORAL DAILY
Qty: 90 TABLET | Refills: 1 | Status: SHIPPED | OUTPATIENT
Start: 2018-07-23 | End: 2018-10-22 | Stop reason: SDUPTHER

## 2018-07-23 RX ORDER — BENAZEPRIL HYDROCHLORIDE 20 MG/1
TABLET ORAL
Qty: 90 TABLET | OUTPATIENT
Start: 2018-07-23

## 2018-07-23 RX ORDER — AMLODIPINE BESYLATE 10 MG/1
TABLET ORAL
Qty: 90 TABLET | Refills: 1 | Status: SHIPPED | OUTPATIENT
Start: 2018-07-23 | End: 2018-10-22 | Stop reason: SDUPTHER

## 2018-09-26 ENCOUNTER — OFFICE VISIT (OUTPATIENT)
Dept: INTERNAL MEDICINE CLINIC | Age: 58
End: 2018-09-26
Payer: MEDICARE

## 2018-09-26 ENCOUNTER — HOSPITAL ENCOUNTER (OUTPATIENT)
Age: 58
Discharge: HOME | End: 2018-09-26
Payer: MEDICARE

## 2018-09-26 VITALS
RESPIRATION RATE: 12 BRPM | DIASTOLIC BLOOD PRESSURE: 60 MMHG | WEIGHT: 161.4 LBS | BODY MASS INDEX: 28.59 KG/M2 | TEMPERATURE: 97.7 F | HEART RATE: 65 BPM | SYSTOLIC BLOOD PRESSURE: 110 MMHG | OXYGEN SATURATION: 98 %

## 2018-09-26 DIAGNOSIS — R91.8 MULTIPLE LUNG NODULES ON CT: ICD-10-CM

## 2018-09-26 DIAGNOSIS — I10 ESSENTIAL HYPERTENSION: ICD-10-CM

## 2018-09-26 DIAGNOSIS — E78.2 MIXED HYPERLIPIDEMIA: ICD-10-CM

## 2018-09-26 DIAGNOSIS — F41.9 ANXIETY: ICD-10-CM

## 2018-09-26 DIAGNOSIS — E11.9 TYPE 2 DIABETES MELLITUS WITHOUT COMPLICATION, WITHOUT LONG-TERM CURRENT USE OF INSULIN (HCC): ICD-10-CM

## 2018-09-26 DIAGNOSIS — I71.21 ASCENDING AORTIC ANEURYSM: ICD-10-CM

## 2018-09-26 DIAGNOSIS — M19.90 OSTEOARTHRITIS, UNSPECIFIED OSTEOARTHRITIS TYPE, UNSPECIFIED SITE: ICD-10-CM

## 2018-09-26 LAB
ALBUMIN SERPL-MCNC: 4.5 GM/DL (ref 3.4–5)
ALP BLD-CCNC: 68 IU/L (ref 40–129)
ALT SERPL-CCNC: 16 U/L (ref 10–40)
ANION GAP SERPL CALCULATED.3IONS-SCNC: 10 MMOL/L (ref 4–16)
AST SERPL-CCNC: 13 IU/L (ref 15–37)
BILIRUB SERPL-MCNC: 0.3 MG/DL (ref 0–1)
BUN BLDV-MCNC: 16 MG/DL (ref 6–23)
CALCIUM SERPL-MCNC: 9.3 MG/DL (ref 8.3–10.6)
CHLORIDE BLD-SCNC: 102 MMOL/L (ref 99–110)
CHOLESTEROL, FASTING: 163 MG/DL
CO2: 31 MMOL/L (ref 21–32)
CREAT SERPL-MCNC: 0.9 MG/DL (ref 0.9–1.3)
CREATININE URINE POCT: 300
GFR AFRICAN AMERICAN: >60 ML/MIN/1.73M2
GFR NON-AFRICAN AMERICAN: >60 ML/MIN/1.73M2
GLUCOSE BLD-MCNC: 132 MG/DL (ref 70–99)
HBA1C MFR BLD: 6.1 %
HDLC SERPL-MCNC: 38 MG/DL
LDL CHOLESTEROL DIRECT: 83 MG/DL
MICROALBUMIN/CREAT 24H UR: 30 MG/G{CREAT}
MICROALBUMIN/CREAT UR-RTO: <30
POTASSIUM SERPL-SCNC: 4.2 MMOL/L (ref 3.5–5.1)
SODIUM BLD-SCNC: 143 MMOL/L (ref 135–145)
TOTAL PROTEIN: 7.1 GM/DL (ref 6.4–8.2)
TRIGLYCERIDE, FASTING: 304 MG/DL

## 2018-09-26 PROCEDURE — 80061 LIPID PANEL: CPT

## 2018-09-26 PROCEDURE — 99214 OFFICE O/P EST MOD 30 MIN: CPT | Performed by: INTERNAL MEDICINE

## 2018-09-26 PROCEDURE — 80053 COMPREHEN METABOLIC PANEL: CPT

## 2018-09-26 PROCEDURE — 83036 HEMOGLOBIN GLYCOSYLATED A1C: CPT | Performed by: INTERNAL MEDICINE

## 2018-09-26 PROCEDURE — 36415 COLL VENOUS BLD VENIPUNCTURE: CPT

## 2018-09-26 PROCEDURE — 82044 UR ALBUMIN SEMIQUANTITATIVE: CPT | Performed by: INTERNAL MEDICINE

## 2018-09-26 RX ORDER — UBIQUINOL 100 MG
CAPSULE ORAL
Qty: 100 EACH | Refills: 5 | Status: SHIPPED | OUTPATIENT
Start: 2018-09-26 | End: 2021-01-21

## 2018-09-26 RX ORDER — GLUCOSAMINE HCL/CHONDROITIN SU 500-400 MG
CAPSULE ORAL
Qty: 100 STRIP | Refills: 1 | Status: SHIPPED | OUTPATIENT
Start: 2018-09-26 | End: 2021-01-21

## 2018-09-26 ASSESSMENT — ENCOUNTER SYMPTOMS
EYES NEGATIVE: 1
GASTROINTESTINAL NEGATIVE: 1
RESPIRATORY NEGATIVE: 1

## 2018-09-26 NOTE — ASSESSMENT & PLAN NOTE
Pt has DM since ? 2007  Sees Ophthalmologist once a year  change to Janumet 50/1000 mg bid  hga1c today  Feet exam today.

## 2018-09-26 NOTE — ASSESSMENT & PLAN NOTE
Hyperlipidemia in good control  On atorvastatin 40 mg daily  Patient is stable. Continue current treatment. Follow diet. Results pending.

## 2018-09-26 NOTE — PROGRESS NOTES
nerves II through XII are grossly intact. FEET exam :   Visual inspection:  Deformity/amputation: absent  Skin lesions/pre-ulcerative calluses: absent  Edema: right- negative, left- negative    Sensory exam:  Monofilament sensation: normal  (minimum of 5 random plantar locations tested, avoiding callused areas - > 1 area with absence of sensation is + for neuropathy)    Plus at least one of the following:  Pulses: normal,   Pinprick: Intact        IMPRESSION:    Encounter Diagnoses   Name Primary?  Type 2 diabetes mellitus without complication, without long-term current use of insulin (HCC)     Essential hypertension     Anxiety     Mixed hyperlipidemia     Osteoarthritis, unspecified osteoarthritis type, unspecified site     Ascending aortic aneurysm (HCC)     Multiple lung nodules on CT        ASSESSMENT/PLAN:      Type 2 diabetes mellitus without complication (HCC)  Pt has DM since ? 2007  Sees Ophthalmologist once a year  change to Janumet 50/1000 mg bid  hga1c today is 6.1. Cont same   Micro alb normal.   Feet exam today. Essential hypertension  Hypertension in control  Patient is on amlodipine, benazepril and carvedilol  Patient is stable. Continue current treatment. Anxiety  Sees psychologist [facility] 1-2 times in 3 months. He takes a drive with pt. Pt states Dr Michelle Garza has retired and he found another psychologist and is going to see her. Mixed hyperlipidemia  Hyperlipidemia in good control  On atorvastatin 40 mg daily  Patient is stable. Continue current treatment. Follow diet. Results pending. Osteoarthritis  [facility] [place] Dr Marlon Hernandez. He told him cannot do much to him. Ascending aortic aneurysm ([address]  8/16: CT chest :Mild aneurysmal dilation of the ascending aorta measuring up to 4.1 cm.  9/16: stable. Ectasia 4.1 cm  9/17: stable. Ectasia 4.1 cm  Recheck in one year    Multiple lung nodules on CT  Small 2-4 mm nodules on CT in 10/16. F/u in one year.

## 2018-10-01 ENCOUNTER — HOSPITAL ENCOUNTER (OUTPATIENT)
Dept: CT IMAGING | Age: 58
Discharge: HOME | End: 2018-10-01
Payer: MEDICARE

## 2018-10-01 DIAGNOSIS — I71.21 ASCENDING AORTIC ANEURYSM: ICD-10-CM

## 2018-10-01 PROCEDURE — 71250 CT THORAX DX C-: CPT

## 2018-10-22 RX ORDER — ATORVASTATIN CALCIUM 40 MG/1
TABLET, FILM COATED ORAL
Qty: 90 TABLET | Refills: 1 | Status: SHIPPED | OUTPATIENT
Start: 2018-10-22 | End: 2019-01-15 | Stop reason: SDUPTHER

## 2018-10-22 RX ORDER — SITAGLIPTIN AND METFORMIN HYDROCHLORIDE 1000; 50 MG/1; MG/1
TABLET, FILM COATED ORAL
Qty: 180 TABLET | Refills: 1 | Status: SHIPPED | OUTPATIENT
Start: 2018-10-22 | End: 2019-01-15 | Stop reason: SDUPTHER

## 2018-10-22 RX ORDER — AMLODIPINE BESYLATE 10 MG/1
TABLET ORAL
Qty: 90 TABLET | Refills: 1 | Status: SHIPPED | OUTPATIENT
Start: 2018-10-22 | End: 2019-01-15 | Stop reason: SDUPTHER

## 2018-10-22 RX ORDER — BENAZEPRIL HYDROCHLORIDE 20 MG/1
TABLET ORAL
Qty: 90 TABLET | Refills: 1 | Status: SHIPPED | OUTPATIENT
Start: 2018-10-22 | End: 2019-01-15 | Stop reason: SDUPTHER

## 2019-01-15 ENCOUNTER — OFFICE VISIT (OUTPATIENT)
Dept: INTERNAL MEDICINE CLINIC | Age: 59
End: 2019-01-15
Payer: MEDICARE

## 2019-01-15 VITALS
DIASTOLIC BLOOD PRESSURE: 60 MMHG | SYSTOLIC BLOOD PRESSURE: 104 MMHG | HEART RATE: 62 BPM | WEIGHT: 161 LBS | OXYGEN SATURATION: 96 % | BODY MASS INDEX: 28.52 KG/M2

## 2019-01-15 DIAGNOSIS — I10 ESSENTIAL HYPERTENSION: ICD-10-CM

## 2019-01-15 DIAGNOSIS — M19.90 OSTEOARTHRITIS, UNSPECIFIED OSTEOARTHRITIS TYPE, UNSPECIFIED SITE: ICD-10-CM

## 2019-01-15 DIAGNOSIS — I71.21 ASCENDING AORTIC ANEURYSM: ICD-10-CM

## 2019-01-15 DIAGNOSIS — F41.9 ANXIETY: ICD-10-CM

## 2019-01-15 DIAGNOSIS — E11.9 TYPE 2 DIABETES MELLITUS WITHOUT COMPLICATION, WITHOUT LONG-TERM CURRENT USE OF INSULIN (HCC): Primary | ICD-10-CM

## 2019-01-15 DIAGNOSIS — E78.2 MIXED HYPERLIPIDEMIA: ICD-10-CM

## 2019-01-15 PROCEDURE — 99213 OFFICE O/P EST LOW 20 MIN: CPT | Performed by: INTERNAL MEDICINE

## 2019-01-15 RX ORDER — SITAGLIPTIN AND METFORMIN HYDROCHLORIDE 1000; 50 MG/1; MG/1
TABLET, FILM COATED ORAL
Qty: 180 TABLET | Refills: 1 | Status: SHIPPED | OUTPATIENT
Start: 2019-01-15 | End: 2019-04-22 | Stop reason: SDUPTHER

## 2019-01-15 RX ORDER — AMLODIPINE BESYLATE 10 MG/1
TABLET ORAL
Qty: 90 TABLET | Refills: 1 | Status: SHIPPED | OUTPATIENT
Start: 2019-01-15 | End: 2019-04-22 | Stop reason: SDUPTHER

## 2019-01-15 RX ORDER — ATORVASTATIN CALCIUM 40 MG/1
TABLET, FILM COATED ORAL
Qty: 90 TABLET | Refills: 1 | Status: SHIPPED | OUTPATIENT
Start: 2019-01-15 | End: 2019-04-22 | Stop reason: SDUPTHER

## 2019-01-15 RX ORDER — BENAZEPRIL HYDROCHLORIDE 20 MG/1
TABLET ORAL
Qty: 90 TABLET | Refills: 1 | Status: SHIPPED | OUTPATIENT
Start: 2019-01-15 | End: 2019-04-22 | Stop reason: SDUPTHER

## 2019-01-15 RX ORDER — CARVEDILOL 12.5 MG/1
TABLET ORAL
Qty: 180 TABLET | Refills: 1 | Status: SHIPPED | OUTPATIENT
Start: 2019-01-15 | End: 2019-04-22 | Stop reason: SDUPTHER

## 2019-04-19 ENCOUNTER — HOSPITAL ENCOUNTER (OUTPATIENT)
Age: 59
Discharge: HOME | End: 2019-04-19
Payer: MEDICARE

## 2019-04-19 DIAGNOSIS — E11.9 TYPE 2 DIABETES MELLITUS WITHOUT COMPLICATION, WITHOUT LONG-TERM CURRENT USE OF INSULIN (HCC): ICD-10-CM

## 2019-04-19 DIAGNOSIS — I10 ESSENTIAL HYPERTENSION: ICD-10-CM

## 2019-04-19 DIAGNOSIS — E78.2 MIXED HYPERLIPIDEMIA: ICD-10-CM

## 2019-04-19 LAB
ALBUMIN SERPL-MCNC: 4.3 GM/DL (ref 3.4–5)
ALP BLD-CCNC: 76 IU/L (ref 40–129)
ALT SERPL-CCNC: 17 U/L (ref 10–40)
ANION GAP SERPL CALCULATED.3IONS-SCNC: 12 MMOL/L (ref 4–16)
AST SERPL-CCNC: 16 IU/L (ref 15–37)
BILIRUB SERPL-MCNC: 0.5 MG/DL (ref 0–1)
BUN BLDV-MCNC: 16 MG/DL (ref 6–23)
CALCIUM SERPL-MCNC: 9.5 MG/DL (ref 8.3–10.6)
CHLORIDE BLD-SCNC: 103 MMOL/L (ref 99–110)
CHOLESTEROL, FASTING: 208 MG/DL
CO2: 27 MMOL/L (ref 21–32)
CREAT SERPL-MCNC: 1.1 MG/DL (ref 0.9–1.3)
ESTIMATED AVERAGE GLUCOSE: 148 MG/DL
GFR AFRICAN AMERICAN: >60 ML/MIN/1.73M2
GFR NON-AFRICAN AMERICAN: >60 ML/MIN/1.73M2
GLUCOSE BLD-MCNC: 155 MG/DL (ref 70–99)
HBA1C MFR BLD: 6.8 % (ref 4.2–6.3)
HDLC SERPL-MCNC: 34 MG/DL
LDL CHOLESTEROL DIRECT: 152 MG/DL
POTASSIUM SERPL-SCNC: 4.3 MMOL/L (ref 3.5–5.1)
SODIUM BLD-SCNC: 142 MMOL/L (ref 135–145)
TOTAL PROTEIN: 6.8 GM/DL (ref 6.4–8.2)
TRIGLYCERIDE, FASTING: 211 MG/DL

## 2019-04-19 PROCEDURE — 80053 COMPREHEN METABOLIC PANEL: CPT

## 2019-04-19 PROCEDURE — 80061 LIPID PANEL: CPT

## 2019-04-19 PROCEDURE — 83036 HEMOGLOBIN GLYCOSYLATED A1C: CPT

## 2019-04-19 PROCEDURE — 36415 COLL VENOUS BLD VENIPUNCTURE: CPT

## 2019-04-22 ENCOUNTER — OFFICE VISIT (OUTPATIENT)
Dept: INTERNAL MEDICINE CLINIC | Age: 59
End: 2019-04-22
Payer: MEDICARE

## 2019-04-22 VITALS
SYSTOLIC BLOOD PRESSURE: 112 MMHG | TEMPERATURE: 98.2 F | WEIGHT: 162 LBS | DIASTOLIC BLOOD PRESSURE: 60 MMHG | HEART RATE: 60 BPM | OXYGEN SATURATION: 98 % | RESPIRATION RATE: 16 BRPM | BODY MASS INDEX: 28.7 KG/M2

## 2019-04-22 DIAGNOSIS — E78.2 MIXED HYPERLIPIDEMIA: ICD-10-CM

## 2019-04-22 DIAGNOSIS — M19.90 OSTEOARTHRITIS, UNSPECIFIED OSTEOARTHRITIS TYPE, UNSPECIFIED SITE: ICD-10-CM

## 2019-04-22 DIAGNOSIS — E11.9 TYPE 2 DIABETES MELLITUS WITHOUT COMPLICATION, WITHOUT LONG-TERM CURRENT USE OF INSULIN (HCC): ICD-10-CM

## 2019-04-22 DIAGNOSIS — I10 ESSENTIAL HYPERTENSION: ICD-10-CM

## 2019-04-22 DIAGNOSIS — F41.9 ANXIETY: ICD-10-CM

## 2019-04-22 DIAGNOSIS — I71.21 ASCENDING AORTIC ANEURYSM: ICD-10-CM

## 2019-04-22 PROCEDURE — 99213 OFFICE O/P EST LOW 20 MIN: CPT | Performed by: INTERNAL MEDICINE

## 2019-04-22 RX ORDER — BENAZEPRIL HYDROCHLORIDE 20 MG/1
TABLET ORAL
Qty: 90 TABLET | Refills: 1 | Status: SHIPPED | OUTPATIENT
Start: 2019-04-22 | End: 2019-10-23 | Stop reason: SDUPTHER

## 2019-04-22 RX ORDER — CARVEDILOL 12.5 MG/1
TABLET ORAL
Qty: 180 TABLET | Refills: 1 | Status: SHIPPED | OUTPATIENT
Start: 2019-04-22 | End: 2019-10-23 | Stop reason: SDUPTHER

## 2019-04-22 RX ORDER — ATORVASTATIN CALCIUM 40 MG/1
TABLET, FILM COATED ORAL
Qty: 90 TABLET | Refills: 1 | Status: SHIPPED | OUTPATIENT
Start: 2019-04-22 | End: 2019-10-23 | Stop reason: SDUPTHER

## 2019-04-22 RX ORDER — AMLODIPINE BESYLATE 10 MG/1
TABLET ORAL
Qty: 90 TABLET | Refills: 1 | Status: SHIPPED | OUTPATIENT
Start: 2019-04-22 | End: 2019-10-23 | Stop reason: SDUPTHER

## 2019-04-22 RX ORDER — SITAGLIPTIN AND METFORMIN HYDROCHLORIDE 1000; 50 MG/1; MG/1
TABLET, FILM COATED ORAL
Qty: 180 TABLET | Refills: 1 | Status: SHIPPED | OUTPATIENT
Start: 2019-04-22 | End: 2019-10-23 | Stop reason: SDUPTHER

## 2019-04-22 ASSESSMENT — PATIENT HEALTH QUESTIONNAIRE - PHQ9
SUM OF ALL RESPONSES TO PHQ9 QUESTIONS 1 & 2: 1
1. LITTLE INTEREST OR PLEASURE IN DOING THINGS: 1
SUM OF ALL RESPONSES TO PHQ QUESTIONS 1-9: 1
SUM OF ALL RESPONSES TO PHQ QUESTIONS 1-9: 1
2. FEELING DOWN, DEPRESSED OR HOPELESS: 0

## 2019-04-22 NOTE — ASSESSMENT & PLAN NOTE
Hyperlipidemia : LDL is 152. It went. Pt is not compliant with med   On atorvastatin 40 mg daily  Patient is stable. Continue current treatment. Follow diet.

## 2019-04-22 NOTE — PROGRESS NOTES
Olga Lopez  Patient's  is 1960  Seen in office on 2019      SUBJECTIVE:  Carrie Liu sonia 60 y. o.year old male presents today   Chief Complaint   Patient presents with    Diabetes    Results     lab review    Other     does not want any testing that requires a co-pay her in the office or eslewhere    Medication Refill     Pt is here for f/u of DM, HTN, anxiety and other medical problems  Patient has DM. No hypoglycemia. No numbness or weakness. No dizziness. Blood sugars are good at home. Patient has hypertension. Taking medications No headaches, no chest pain, no palpitations and no dizziness. Patient has hyperlipidemia. Taking medications. No abdominal pain, no nausea or vomiting. No myalgias. Taking medications regularly. No side effects noted. Pt does not want any test done or preventive medicine      Review of Systems    OBJECTIVE: /60   Pulse 60   Temp 98.2 °F (36.8 °C) (Oral)   Resp 16   Wt 162 lb (73.5 kg)   SpO2 98%   BMI 28.70 kg/m²     Wt Readings from Last 3 Encounters:   19 162 lb (73.5 kg)   01/15/19 161 lb (73 kg)   18 161 lb 6.4 oz (73.2 kg)      GENERAL: - Alert, oriented, pleasant, in no apparent distress. HEENT: - Conjunctiva pink, no scleral icterus. ENT clear. NECK: -Supple. No jugular venous distention noted. No masses felt,  CARDIOVASCULAR: - Normal S1 and S2    PULMONARY: - No respiratory distress. No wheezes or rales. ABDOMEN: - Soft and non-tender,no masses  ororganomegaly. EXTREMITIES: - No cyanosis, clubbing, or significant edema. SKIN: Skin is warm and dry. NEUROLOGICAL: - Cranial nerves II through XII are grossly intact. IMPRESSION:    Encounter Diagnoses   Name Primary?     Essential hypertension     Mixed hyperlipidemia     Osteoarthritis, unspecified osteoarthritis type, unspecified site     Type 2 diabetes mellitus without complication, without long-term current use of insulin (HCC)     Anxiety     Ascending aortic aneurysm [facility])        ASSESSMENT/PLAN:    Essential hypertension  Hypertension in control  Patient is on amlodipine, benazepril and carvedilol  Patient is stable. Continue current treatment. Mixed hyperlipidemia  Hyperlipidemia : LDL is 152. It went. Pt is not compliant with med   On atorvastatin 40 mg daily  Patient is stable. Continue current treatment. Follow diet. Osteoarthritis  Pt has pain in different locations : feet, hands and hips   Take tylenol prn     Type 2 diabetes mellitus without complication (HCC)  Pt has DM since ? 2007  Sees Ophthalmologist once a year  change to Janumet 50/1000 mg bid  Hga1c 6.8    Anxiety  Pt sees Miguel Ángel Garner now q 3 months     Ascending aortic aneurysm ([address]  8/16: CT chest :Mild aneurysmal dilation of the ascending aorta measuring up to 4.1 cm.  9/16: stable. Ectasia 4.1 cm  9/17: stable. Ectasia 4.1 cm  10/2018 : 4.1 cm   Recheck in one year    No orders of the defined types were placed in this encounter. Return to office in 3 months. Mediations reviewed with the patient. Continue current medications. Appropriate prescriptions are addressed. After visit summeryprovided. Follow up as directed sooner if needed. Questions answered and patient verbalizes understanding. Call for any problems, questions, or concerns.        Allergies   Allergen Reactions    Etodolac Other (See Comments)     Drop in bp     Efudex [Fluorouracil]      Current Outpatient Medications   Medication Sig Dispense Refill    amLODIPine (NORVASC) 10 MG tablet TAKE ONE TABLET BY MOUTH DAILY 90 tablet 1    benazepril (LOTENSIN) 20 MG tablet TAKE ONE TABLET BY MOUTH DAILY 90 tablet 1    atorvastatin (LIPITOR) 40 MG tablet TAKE ONE TABLET BY MOUTH DAILY 90 tablet 1    carvedilol (COREG) 12.5 MG tablet TAKE ONE TABLET BY MOUTH TWICE DAILY with meals 180 tablet 1    JANUMET  MG per tablet TAKE ONE TABLET BY MOUTH TWICE DAILY with meals 180 tablet 1    ONE TOUCH LANCETS MISC Testing once daily, DX=E11.9 100 each 1    blood glucose monitor strips Please dispense One Touch Test strips, testing once daily, DX=E11.9 100 strip 1    Alcohol Swabs (ALCOHOL PREP) 70 % PADS Using once daily and prn, DX=E11.9 100 each 5    aspirin 81 MG tablet Take 81 mg by mouth daily.  Tetanus-Diphth-Acell Pertussis (BOOSTRIX) 5-2.5-18.5 LF-MCG/0.5 injection Boostrix Tdap 2.5 Lf unit-8 mcg-5 Lf/0.5 mL intramuscular syringe       No current facility-administered medications for this visit. Past Medical History:   Diagnosis Date    Adhesive capsulitis of shoulder 4/23/2011    Anxiety     h/o anxiety workman comp related. sees a psychologist    [facility] 9/9/2016 8/16: CT chest :Mild aneurysmal dilation of the ascending aorta measuring up to 4.1 cm. Recheck in one year    Backache, unspecified 7/24/2013    Sees dr. Heladio Tompkins    Chronic back pain     Colonoscopy refused     Depression     Diabetes mellitus ([facility]     ED (erectile dysfunction) 1/25/2014    H/O CHF 2003    resolved  EF 25-30 % improved 60% in 3/05    H/O echocardiogram 2011    EF 60 %    Hyperlipidemia     Hypertension     Legionella pneumonia ([facility] 10/27/2016    10/16. Resolved. Seen Danny Norman.  Limitation due to disability     due to anxiety and agrophobia    Liver dysfunction     blood w/u neg. US fatty liver    Liver dysfunction 10/27/2016    Liver test were abnormal with legionelle pneumonia Now back to normal.    Multiple lung nodules on CT 11/29/2016    Small 2-4 mm nodules on CT in 10/16. F/u in one year. Seen Dr Danny Norman.     Osteoarthritis     spine, hip and foot per pt. sees Dr. Joaquina Hicks    Perirectal abscess     resovled     Past Surgical History:   Procedure Laterality Date    APPENDECTOMY      CHOLECYSTECTOMY, LAPAROSCOPIC  05/06    GALLBLADDER SURGERY      PERICARDIUM SURGERY      TONSILLECTOMY      WRIST FRACTURE SURGERY       Social History     Tobacco Use    Smoking

## 2019-04-22 NOTE — ASSESSMENT & PLAN NOTE
Pt has DM since ? 2007  Sees Ophthalmologist once a year  change to Janumet 50/1000 mg bid  Hga1c 6.8

## 2019-07-22 ENCOUNTER — OFFICE VISIT (OUTPATIENT)
Dept: INTERNAL MEDICINE CLINIC | Age: 59
End: 2019-07-22
Payer: MEDICARE

## 2019-07-22 VITALS
OXYGEN SATURATION: 97 % | DIASTOLIC BLOOD PRESSURE: 62 MMHG | SYSTOLIC BLOOD PRESSURE: 102 MMHG | RESPIRATION RATE: 16 BRPM | HEART RATE: 64 BPM | BODY MASS INDEX: 28.03 KG/M2 | WEIGHT: 164.2 LBS | TEMPERATURE: 98.3 F | HEIGHT: 64 IN

## 2019-07-22 DIAGNOSIS — F41.9 ANXIETY: ICD-10-CM

## 2019-07-22 DIAGNOSIS — E11.9 TYPE 2 DIABETES MELLITUS WITHOUT COMPLICATION, WITHOUT LONG-TERM CURRENT USE OF INSULIN (HCC): ICD-10-CM

## 2019-07-22 DIAGNOSIS — I10 ESSENTIAL HYPERTENSION: Primary | ICD-10-CM

## 2019-07-22 DIAGNOSIS — I71.21 ASCENDING AORTIC ANEURYSM: ICD-10-CM

## 2019-07-22 DIAGNOSIS — E78.2 MIXED HYPERLIPIDEMIA: ICD-10-CM

## 2019-07-22 PROCEDURE — 99213 OFFICE O/P EST LOW 20 MIN: CPT | Performed by: INTERNAL MEDICINE

## 2019-07-22 ASSESSMENT — ENCOUNTER SYMPTOMS
GASTROINTESTINAL NEGATIVE: 1
RESPIRATORY NEGATIVE: 1
ALLERGIC/IMMUNOLOGIC NEGATIVE: 1
EYES NEGATIVE: 1

## 2019-07-22 NOTE — PROGRESS NOTES
dispense One Touch Test strips, testing once daily, DX=E11.9 100 strip 1    Alcohol Swabs (ALCOHOL PREP) 70 % PADS Using once daily and prn, DX=E11.9 100 each 5     No current facility-administered medications for this visit. Past Medical History:   Diagnosis Date    Adhesive capsulitis of shoulder 2011    Anxiety     h/o anxiety workman comp related. sees a psychologist    [facility] 2016: CT chest :Mild aneurysmal dilation of the ascending aorta measuring up to 4.1 cm. Recheck in one year    Backache, unspecified 2013    Sees dr. Kael Bustamante    Chronic back pain     Colonoscopy refused     Depression     Diabetes mellitus ([facility]     ED (erectile dysfunction) 2014    H/O CHF     resolved  EF 25-30 % improved 60% in 3/05    H/O echocardiogram     EF 60 %    Hyperlipidemia     Hypertension     [facility] 10/27/2016    10/16. Resolved. Seen Delia Iraheta.  Limitation due to disability     due to anxiety and agrophobia    Liver dysfunction     blood w/u neg. US fatty liver    Liver dysfunction 10/27/2016    Liver test were abnormal with legionelle pneumonia Now back to normal.    Multiple lung nodules on CT 2016    Small 2-4 mm nodules on CT in 10/16. F/u in one year. Seen Dr Delia Iraheta.     Osteoarthritis     spine, hip and foot per pt. sees Dr. David Abernathy    Perirectal abscess     resovled     Past Surgical History:   Procedure Laterality Date    APPENDECTOMY      CHOLECYSTECTOMY, LAPAROSCOPIC      GALLBLADDER SURGERY      PERICARDIUM SURGERY      TONSILLECTOMY      WRIST FRACTURE SURGERY       Social History     Tobacco Use    Smoking status: Former Smoker     Packs/day: 0.10     Years: 2.00     Pack years: 0.20     Types: Cigarettes     Last attempt to quit: 1983     Years since quittin.5    Smokeless tobacco: Never Used   Substance Use Topics    Alcohol use: No     Alcohol/week: 0.0 standard

## 2019-10-23 ENCOUNTER — OFFICE VISIT (OUTPATIENT)
Dept: INTERNAL MEDICINE CLINIC | Age: 59
End: 2019-10-23
Payer: MEDICARE

## 2019-10-23 VITALS
BODY MASS INDEX: 27.6 KG/M2 | SYSTOLIC BLOOD PRESSURE: 102 MMHG | WEIGHT: 160.8 LBS | OXYGEN SATURATION: 98 % | DIASTOLIC BLOOD PRESSURE: 64 MMHG | TEMPERATURE: 98.1 F | HEART RATE: 72 BPM | RESPIRATION RATE: 16 BRPM

## 2019-10-23 DIAGNOSIS — I10 ESSENTIAL HYPERTENSION: ICD-10-CM

## 2019-10-23 DIAGNOSIS — I71.21 ASCENDING AORTIC ANEURYSM: ICD-10-CM

## 2019-10-23 DIAGNOSIS — M19.90 OSTEOARTHRITIS, UNSPECIFIED OSTEOARTHRITIS TYPE, UNSPECIFIED SITE: ICD-10-CM

## 2019-10-23 DIAGNOSIS — E78.2 MIXED HYPERLIPIDEMIA: ICD-10-CM

## 2019-10-23 DIAGNOSIS — F41.9 ANXIETY: ICD-10-CM

## 2019-10-23 DIAGNOSIS — E11.9 TYPE 2 DIABETES MELLITUS WITHOUT COMPLICATION, WITHOUT LONG-TERM CURRENT USE OF INSULIN (HCC): Primary | ICD-10-CM

## 2019-10-23 LAB
CREATININE URINE POCT: ABNORMAL
HBA1C MFR BLD: 7.6 %
MICROALBUMIN/CREAT 24H UR: ABNORMAL MG/G{CREAT}
MICROALBUMIN/CREAT UR-RTO: ABNORMAL

## 2019-10-23 PROCEDURE — 99213 OFFICE O/P EST LOW 20 MIN: CPT | Performed by: INTERNAL MEDICINE

## 2019-10-23 PROCEDURE — 82044 UR ALBUMIN SEMIQUANTITATIVE: CPT | Performed by: INTERNAL MEDICINE

## 2019-10-23 PROCEDURE — 83036 HEMOGLOBIN GLYCOSYLATED A1C: CPT | Performed by: INTERNAL MEDICINE

## 2019-10-23 RX ORDER — CARVEDILOL 12.5 MG/1
TABLET ORAL
Qty: 180 TABLET | Refills: 1 | Status: SHIPPED | OUTPATIENT
Start: 2019-10-23 | End: 2020-01-31 | Stop reason: SDUPTHER

## 2019-10-23 RX ORDER — BENAZEPRIL HYDROCHLORIDE 20 MG/1
TABLET ORAL
Qty: 90 TABLET | Refills: 1 | Status: SHIPPED | OUTPATIENT
Start: 2019-10-23 | End: 2020-01-31 | Stop reason: SDUPTHER

## 2019-10-23 RX ORDER — ATORVASTATIN CALCIUM 40 MG/1
TABLET, FILM COATED ORAL
Qty: 90 TABLET | Refills: 1 | Status: SHIPPED | OUTPATIENT
Start: 2019-10-23 | End: 2020-01-31 | Stop reason: SDUPTHER

## 2019-10-23 RX ORDER — SITAGLIPTIN AND METFORMIN HYDROCHLORIDE 1000; 50 MG/1; MG/1
TABLET, FILM COATED ORAL
Qty: 180 TABLET | Refills: 1 | Status: SHIPPED | OUTPATIENT
Start: 2019-10-23 | End: 2020-01-31 | Stop reason: SDUPTHER

## 2019-10-23 RX ORDER — AMLODIPINE BESYLATE 10 MG/1
TABLET ORAL
Qty: 90 TABLET | Refills: 1 | Status: SHIPPED | OUTPATIENT
Start: 2019-10-23 | End: 2020-01-31 | Stop reason: SDUPTHER

## 2019-10-23 ASSESSMENT — ENCOUNTER SYMPTOMS
COUGH: 0
WHEEZING: 0
SHORTNESS OF BREATH: 0
EYES NEGATIVE: 1
ALLERGIC/IMMUNOLOGIC NEGATIVE: 1

## 2020-01-29 ENCOUNTER — HOSPITAL ENCOUNTER (OUTPATIENT)
Age: 60
Discharge: HOME | End: 2020-01-29
Payer: MEDICARE

## 2020-01-29 LAB
ALBUMIN SERPL-MCNC: 4.9 GM/DL (ref 3.4–5)
ALP BLD-CCNC: 105 IU/L (ref 40–129)
ALT SERPL-CCNC: 44 U/L (ref 10–40)
ANION GAP SERPL CALCULATED.3IONS-SCNC: 16 MMOL/L (ref 4–16)
AST SERPL-CCNC: 29 IU/L (ref 15–37)
BILIRUB SERPL-MCNC: 0.4 MG/DL (ref 0–1)
BUN BLDV-MCNC: 18 MG/DL (ref 6–23)
CALCIUM SERPL-MCNC: 10 MG/DL (ref 8.3–10.6)
CHLORIDE BLD-SCNC: 99 MMOL/L (ref 99–110)
CHOLESTEROL, FASTING: 168 MG/DL
CO2: 26 MMOL/L (ref 21–32)
CREAT SERPL-MCNC: 1 MG/DL (ref 0.9–1.3)
GFR AFRICAN AMERICAN: >60 ML/MIN/1.73M2
GFR NON-AFRICAN AMERICAN: >60 ML/MIN/1.73M2
GLUCOSE FASTING: 161 MG/DL (ref 70–99)
HDLC SERPL-MCNC: 36 MG/DL
LDL CHOLESTEROL DIRECT: 109 MG/DL
POTASSIUM SERPL-SCNC: 4.8 MMOL/L (ref 3.5–5.1)
SODIUM BLD-SCNC: 141 MMOL/L (ref 135–145)
TOTAL PROTEIN: 7.6 GM/DL (ref 6.4–8.2)
TRIGLYCERIDE, FASTING: 194 MG/DL

## 2020-01-29 PROCEDURE — 80053 COMPREHEN METABOLIC PANEL: CPT

## 2020-01-29 PROCEDURE — 36415 COLL VENOUS BLD VENIPUNCTURE: CPT

## 2020-01-29 PROCEDURE — 80061 LIPID PANEL: CPT

## 2020-01-31 ENCOUNTER — OFFICE VISIT (OUTPATIENT)
Dept: INTERNAL MEDICINE CLINIC | Age: 60
End: 2020-01-31
Payer: MEDICARE

## 2020-01-31 VITALS
DIASTOLIC BLOOD PRESSURE: 60 MMHG | WEIGHT: 157.8 LBS | RESPIRATION RATE: 16 BRPM | TEMPERATURE: 97.9 F | BODY MASS INDEX: 27.09 KG/M2 | OXYGEN SATURATION: 98 % | SYSTOLIC BLOOD PRESSURE: 110 MMHG | HEART RATE: 64 BPM

## 2020-01-31 LAB — HBA1C MFR BLD: 6.9 %

## 2020-01-31 PROCEDURE — 99214 OFFICE O/P EST MOD 30 MIN: CPT | Performed by: INTERNAL MEDICINE

## 2020-01-31 PROCEDURE — 83036 HEMOGLOBIN GLYCOSYLATED A1C: CPT | Performed by: INTERNAL MEDICINE

## 2020-01-31 RX ORDER — BENAZEPRIL HYDROCHLORIDE 20 MG/1
TABLET ORAL
Qty: 90 TABLET | Refills: 1 | Status: SHIPPED | OUTPATIENT
Start: 2020-01-31 | End: 2020-07-20 | Stop reason: SDUPTHER

## 2020-01-31 RX ORDER — ATORVASTATIN CALCIUM 40 MG/1
TABLET, FILM COATED ORAL
Qty: 90 TABLET | Refills: 1 | Status: SHIPPED | OUTPATIENT
Start: 2020-01-31 | End: 2020-10-23 | Stop reason: SDUPTHER

## 2020-01-31 RX ORDER — SITAGLIPTIN AND METFORMIN HYDROCHLORIDE 1000; 50 MG/1; MG/1
TABLET, FILM COATED ORAL
Qty: 180 TABLET | Refills: 1 | Status: SHIPPED | OUTPATIENT
Start: 2020-01-31 | End: 2020-07-20 | Stop reason: SDUPTHER

## 2020-01-31 RX ORDER — AMLODIPINE BESYLATE 10 MG/1
TABLET ORAL
Qty: 90 TABLET | Refills: 1 | Status: SHIPPED | OUTPATIENT
Start: 2020-01-31 | End: 2020-07-20 | Stop reason: SDUPTHER

## 2020-01-31 RX ORDER — CARVEDILOL 12.5 MG/1
TABLET ORAL
Qty: 180 TABLET | Refills: 1 | Status: SHIPPED | OUTPATIENT
Start: 2020-01-31 | End: 2020-07-20 | Stop reason: SDUPTHER

## 2020-01-31 ASSESSMENT — PATIENT HEALTH QUESTIONNAIRE - PHQ9
SUM OF ALL RESPONSES TO PHQ QUESTIONS 1-9: 2
SUM OF ALL RESPONSES TO PHQ QUESTIONS 1-9: 2
SUM OF ALL RESPONSES TO PHQ9 QUESTIONS 1 & 2: 2
1. LITTLE INTEREST OR PLEASURE IN DOING THINGS: 1
2. FEELING DOWN, DEPRESSED OR HOPELESS: 1

## 2020-01-31 NOTE — ASSESSMENT & PLAN NOTE
Hyperlipidemia in good control  On atorvastatin 40 mg daily  Patient is stable. Continue current treatment. Follow diet.

## 2020-01-31 NOTE — PROGRESS NOTES
ASSESSMENT/PLAN:    Essential hypertension  Hypertension in control  Patient is on amlodipine, benazepril and carvedilol  Patient is stable. Continue current treatment. Mixed hyperlipidemia  Hyperlipidemia in good control  On atorvastatin 40 mg daily  Patient is stable. Continue current treatment. Follow diet. Osteoarthritis  Has arthritis of different joints. But is bearable. Not taking any med  [facility] Dr Hubert Ornelas. He told him cannot do much to him. Type 2 diabetes mellitus without complication (HCC)  Pt has DM since ? 2007  Sees Ophthalmologist once a year  change to Janumet 50/1000 mg bid    Anxiety  Pt sees Cody Hodgkin now q 3 months     Ascending aortic aneurysm ([address]  8/16: CT chest :Mild aneurysmal dilation of the ascending aorta measuring up to 4.1 cm.  9/16: stable. Ectasia 4.1 cm  9/17: stable. Ectasia 4.1 cm  10/2018 : 4.1 cm   Recheck in one year. Refused : financial reason. Mild elevation of liver test. Repeat . Orders Placed This Encounter   Procedures    US Gallbladder Ruq    Hepatic Function Panel    POCT glycosylated hemoglobin (Hb A1C)     RTO in 1 month      Mediations reviewed with the patient. Continue current medications. Appropriate prescriptions are addressed. After visit summeryprovided. Follow up as directed sooner if needed. Questions answered and patient verbalizes understanding. Call for any problems, questions, or concerns.        Allergies   Allergen Reactions    Etodolac Other (See Comments)     Drop in bp     Efudex [Fluorouracil]      Current Outpatient Medications   Medication Sig Dispense Refill    amLODIPine (NORVASC) 10 MG tablet TAKE ONE TABLET BY MOUTH DAILY 90 tablet 1    benazepril (LOTENSIN) 20 MG tablet TAKE ONE TABLET BY MOUTH DAILY 90 tablet 1    atorvastatin (LIPITOR) 40 MG tablet TAKE ONE TABLET BY MOUTH DAILY 90 tablet 1    carvedilol (COREG) 12.5 MG tablet TAKE ONE TABLET BY MOUTH TWICE DAILY with meals 180 tablet 1    JANUMET  MG per tablet TAKE ONE TABLET BY MOUTH TWICE DAILY with meals 180 tablet 1    ONE TOUCH LANCETS MISC Testing once daily, DX=E11.9 100 each 1    blood glucose monitor strips Please dispense One Touch Test strips, testing once daily, DX=E11.9 100 strip 1    Alcohol Swabs (ALCOHOL PREP) 70 % PADS Using once daily and prn, DX=E11.9 100 each 5    aspirin 81 MG tablet Take 81 mg by mouth daily. No current facility-administered medications for this visit. Past Medical History:   Diagnosis Date    Adhesive capsulitis of shoulder 4/23/2011    Anxiety     h/o anxiety workman comp related. sees a psychologist    Ascending aortic aneurysm ([facility] 9/9/2016 8/16: CT chest :Mild aneurysmal dilation of the ascending aorta measuring up to 4.1 cm. Recheck in one year    Backache, unspecified 7/24/2013    Sees dr. Krysten Leach    Chronic back pain     Colonoscopy refused     Depression     Diabetes mellitus ([facility]     ED (erectile dysfunction) 1/25/2014    H/O CHF 2003    resolved  EF 25-30 % improved 60% in 3/05    H/O echocardiogram 2011    EF 60 %    Hyperlipidemia     Hypertension     Legionella pneumonia ([facility] 10/27/2016    10/16. Resolved. Seen Kim Merrill.  Limitation due to disability     due to anxiety and agrophobia    Liver dysfunction     blood w/u neg. US fatty liver    Liver dysfunction 10/27/2016    Liver test were abnormal with legionelle pneumonia Now back to normal.    Multiple lung nodules on CT 11/29/2016    Small 2-4 mm nodules on CT in 10/16. F/u in one year. Seen Dr Kim Merrill.     Osteoarthritis     spine, hip and foot per pt. sees Dr. Calabrese Bile    Perirectal abscess     resovled     Past Surgical History:   Procedure Laterality Date    APPENDECTOMY      CHOLECYSTECTOMY, LAPAROSCOPIC  05/06    GALLBLADDER SURGERY      PERICARDIUM SURGERY      TONSILLECTOMY      WRIST FRACTURE SURGERY       Social History     Tobacco Use    Smoking status: Former Smoker     Packs/day: 0.10     Years: 2.00     Pack years: 0.20     Types: Cigarettes     Last attempt to quit: 1983     Years since quittin.1    Smokeless tobacco: Never Used   Substance Use Topics    Alcohol use: No     Alcohol/week: 0.0 standard drinks       LAB REVIEW:  CBC:   Lab Results   Component Value Date    WBC 7.0 2018    HGB 14.9 2018    HCT 45.1 2018     2018     Lipids:   Lab Results   Component Value Date    CHOL 135 02/15/2017    TRIG 205 (H) 02/15/2017    HDL 36 (L) 2020    LDLDIRECT 109 (H) 2020    TRIGLYCFAST 194 (H) 2020     Renal:   Lab Results   Component Value Date    BUN 18 2020    CREATININE 1.0 2020     2020    K 4.8 2020    ALT 44 2020    AST 29 2020    GLUCOSE 155 2019     PT/INR:   Lab Results   Component Value Date    INR 1.29 10/10/2016     A1C:   Lab Results   Component Value Date    LABA1C 6.9 2020           Marcelino Miller MD, 2020 , 9:12 AM

## 2020-01-31 NOTE — ASSESSMENT & PLAN NOTE
8/16: CT chest :Mild aneurysmal dilation of the ascending aorta measuring up to 4.1 cm.  9/16: stable. Ectasia 4.1 cm  9/17: stable. Ectasia 4.1 cm  10/2018 : 4.1 cm   Recheck in one year. Refused : financial reason.

## 2020-02-26 ENCOUNTER — HOSPITAL ENCOUNTER (OUTPATIENT)
Age: 60
Discharge: HOME | End: 2020-02-26
Payer: MEDICARE

## 2020-02-26 LAB
ALBUMIN SERPL-MCNC: 4.4 GM/DL (ref 3.4–5)
ALP BLD-CCNC: 90 IU/L (ref 40–129)
ALT SERPL-CCNC: 44 U/L (ref 10–40)
AST SERPL-CCNC: 29 IU/L (ref 15–37)
BILIRUB SERPL-MCNC: 0.4 MG/DL (ref 0–1)
BILIRUBIN DIRECT: 0.2 MG/DL (ref 0–0.3)
BILIRUBIN, INDIRECT: 0.2 MG/DL (ref 0–0.7)
TOTAL PROTEIN: 7.2 GM/DL (ref 6.4–8.2)

## 2020-02-26 PROCEDURE — 36415 COLL VENOUS BLD VENIPUNCTURE: CPT

## 2020-02-26 PROCEDURE — 80076 HEPATIC FUNCTION PANEL: CPT

## 2020-03-02 ENCOUNTER — TELEPHONE (OUTPATIENT)
Dept: INTERNAL MEDICINE CLINIC | Age: 60
End: 2020-03-02

## 2020-03-03 NOTE — TELEPHONE ENCOUNTER
Yes let patient know he needs the ultrasound of the liver and gallbladder. His liver function is still mildly elevated.

## 2020-03-17 ENCOUNTER — HOSPITAL ENCOUNTER (OUTPATIENT)
Dept: ULTRASOUND IMAGING | Age: 60
Discharge: HOME | End: 2020-03-17
Payer: MEDICARE

## 2020-03-17 PROCEDURE — 76705 ECHO EXAM OF ABDOMEN: CPT

## 2020-03-24 ENCOUNTER — OFFICE VISIT (OUTPATIENT)
Dept: INTERNAL MEDICINE CLINIC | Age: 60
End: 2020-03-24
Payer: MEDICARE

## 2020-03-24 VITALS
TEMPERATURE: 98.1 F | BODY MASS INDEX: 27.74 KG/M2 | RESPIRATION RATE: 16 BRPM | OXYGEN SATURATION: 97 % | SYSTOLIC BLOOD PRESSURE: 136 MMHG | DIASTOLIC BLOOD PRESSURE: 72 MMHG | HEART RATE: 72 BPM | WEIGHT: 161.6 LBS

## 2020-03-24 PROCEDURE — 99213 OFFICE O/P EST LOW 20 MIN: CPT | Performed by: INTERNAL MEDICINE

## 2020-03-24 ASSESSMENT — ENCOUNTER SYMPTOMS
GASTROINTESTINAL NEGATIVE: 1
ALLERGIC/IMMUNOLOGIC NEGATIVE: 1
RESPIRATORY NEGATIVE: 1
EYES NEGATIVE: 1

## 2020-03-24 NOTE — PROGRESS NOTES
Domenica Lowe  Patient's  is 1960  Seen in office on 3/24/2020      SUBJECTIVE:  Senthil scott 60 y. o.year old male presents today   Chief Complaint   Patient presents with    Diabetes    Hypertension    Results     lab review     Patient is here for follow-up of diabetes, hypertension hyperlipidemia and ascending aortic aneurysm  Patient states he is feeling well. Has no complaints  Patient has DM. No hypoglycemia. No numbness or weakness. No dizziness. Blood sugars are good at home. Patient has hypertension. Taking medications No headaches, no chest pain, no palpitations and no dizziness. Patient has hyperlipidemia. Taking medications. No abdominal pain, no nausea or vomiting. No myalgias. Patient states his anxiety is stable  He was here for the review of liver function test.  ALT is still 44 same as before. Ultrasound of the liver did not show any acute changes. No mention of fatty liver. Patient denies any alcohol use. He had abnormal liver function test few years ago and had hepatitis ABC checked which were negative. Immune studies were negative and iron levels were normal  Taking medications regularly. No side effects noted. Review of Systems   Constitutional: Negative. HENT: Negative. Eyes: Negative. Respiratory: Negative. Gastrointestinal: Negative. Endocrine: Negative. Genitourinary: Negative. Musculoskeletal: Negative. Allergic/Immunologic: Negative. Neurological: Negative. Hematological: Negative. Psychiatric/Behavioral: Negative. OBJECTIVE: /72   Pulse 72   Temp 98.1 °F (36.7 °C) (Oral)   Resp 16   Wt 161 lb 9.6 oz (73.3 kg)   SpO2 97%   BMI 27.74 kg/m²     Wt Readings from Last 3 Encounters:   20 161 lb 9.6 oz (73.3 kg)   20 157 lb 12.8 oz (71.6 kg)   10/23/19 160 lb 12.8 oz (72.9 kg)      GENERAL: - Alert, oriented, pleasant, in no apparent distress. HEENT: - Conjunctiva pink, no scleral icterus.  ENT patient verbalizes understanding. Call for any problems, questions, or concerns. Allergies   Allergen Reactions    Etodolac Other (See Comments)     Drop in bp     Efudex [Fluorouracil]      Current Outpatient Medications   Medication Sig Dispense Refill    amLODIPine (NORVASC) 10 MG tablet TAKE ONE TABLET BY MOUTH DAILY 90 tablet 1    benazepril (LOTENSIN) 20 MG tablet TAKE ONE TABLET BY MOUTH DAILY 90 tablet 1    atorvastatin (LIPITOR) 40 MG tablet TAKE ONE TABLET BY MOUTH DAILY 90 tablet 1    carvedilol (COREG) 12.5 MG tablet TAKE ONE TABLET BY MOUTH TWICE DAILY with meals 180 tablet 1    JANUMET  MG per tablet TAKE ONE TABLET BY MOUTH TWICE DAILY with meals 180 tablet 1    ONE TOUCH LANCETS MISC Testing once daily, DX=E11.9 100 each 1    blood glucose monitor strips Please dispense One Touch Test strips, testing once daily, DX=E11.9 100 strip 1    aspirin 81 MG tablet Take 81 mg by mouth daily.  Alcohol Swabs (ALCOHOL PREP) 70 % PADS Using once daily and prn, DX=E11.9 100 each 5     No current facility-administered medications for this visit. Past Medical History:   Diagnosis Date    Adhesive capsulitis of shoulder 4/23/2011    Anxiety     h/o anxiety workman comp related. sees a psychologist    Ascending aortic aneurysm ([facility] 9/9/2016 8/16: CT chest :Mild aneurysmal dilation of the ascending aorta measuring up to 4.1 cm. Recheck in one year    Backache, unspecified 7/24/2013    Sees dr. Leif Barber    Chronic back pain     Colonoscopy refused     Depression     Diabetes mellitus ([facility]     ED (erectile dysfunction) 1/25/2014    H/O CHF 2003    resolved  EF 25-30 % improved 60% in 3/05    H/O echocardiogram 2011    EF 60 %    Hyperlipidemia     Hypertension     Legionella pneumonia ([facility] [address] [facility] 10/27/2016    10/16. Resolved. Seen Jessica Singh.  Limitation due to disability     due to anxiety and agrophobia    Liver dysfunction     blood w/u neg.  US fatty liver    Liver dysfunction 10/27/2016    Liver test were abnormal with legionelle pneumonia Now back to normal.    Multiple lung nodules on CT 2016    Small 2-4 mm nodules on CT in 10/16. F/u in one year. Seen Dr Karyle Round.     Osteoarthritis     spine, hip and foot per pt. sees Dr. Rodríguez Query    Perirectal abscess     resovled     Past Surgical History:   Procedure Laterality Date    APPENDECTOMY      CHOLECYSTECTOMY, LAPAROSCOPIC      GALLBLADDER SURGERY      PERICARDIUM SURGERY      TONSILLECTOMY      WRIST FRACTURE SURGERY       Social History     Tobacco Use    Smoking status: Former Smoker     Packs/day: 0.10     Years: 2.00     Pack years: 0.20     Types: Cigarettes     Last attempt to quit: 1983     Years since quittin.2    Smokeless tobacco: Never Used   Substance Use Topics    Alcohol use: No     Alcohol/week: 0.0 standard drinks       LAB REVIEW:  CBC:   Lab Results   Component Value Date    WBC 7.0 2018    HGB 14.9 2018    HCT 45.1 2018     2018     Lipids:   Lab Results   Component Value Date    CHOL 135 02/15/2017    TRIG 205 (H) 02/15/2017    HDL 36 (L) 2020    LDLDIRECT 109 (H) 2020    TRIGLYCFAST 194 (H) 2020     Renal:   Lab Results   Component Value Date    BUN 18 2020    CREATININE 1.0 2020     2020    K 4.8 2020    ALT 44 2020    AST 29 2020    GLUCOSE 155 2019     PT/INR:   Lab Results   Component Value Date    INR 1.29 10/10/2016     A1C:   Lab Results   Component Value Date    LABA1C 6.9 2020           Drake Gutierrez MD, 3/24/2020 , 1:16 PM

## 2020-07-06 ENCOUNTER — HOSPITAL ENCOUNTER (OUTPATIENT)
Dept: CT IMAGING | Age: 60
Discharge: HOME | End: 2020-07-06
Payer: MEDICARE

## 2020-07-06 ENCOUNTER — HOSPITAL ENCOUNTER (OUTPATIENT)
Age: 60
Discharge: HOME | End: 2020-07-06
Payer: MEDICARE

## 2020-07-06 LAB
ALBUMIN SERPL-MCNC: 4.6 GM/DL (ref 3.4–5)
ALP BLD-CCNC: 66 IU/L (ref 40–129)
ALT SERPL-CCNC: 14 U/L (ref 10–40)
AST SERPL-CCNC: 11 IU/L (ref 15–37)
BILIRUB SERPL-MCNC: 0.3 MG/DL (ref 0–1)
BILIRUBIN DIRECT: 0.2 MG/DL (ref 0–0.3)
BILIRUBIN, INDIRECT: 0.1 MG/DL (ref 0–0.7)
TOTAL PROTEIN: 7.1 GM/DL (ref 6.4–8.2)

## 2020-07-06 PROCEDURE — 80076 HEPATIC FUNCTION PANEL: CPT

## 2020-07-06 PROCEDURE — 36415 COLL VENOUS BLD VENIPUNCTURE: CPT

## 2020-07-06 PROCEDURE — 71250 CT THORAX DX C-: CPT

## 2020-07-20 ENCOUNTER — VIRTUAL VISIT (OUTPATIENT)
Dept: INTERNAL MEDICINE CLINIC | Age: 60
End: 2020-07-20
Payer: MEDICARE

## 2020-07-20 PROCEDURE — 99214 OFFICE O/P EST MOD 30 MIN: CPT | Performed by: INTERNAL MEDICINE

## 2020-07-20 RX ORDER — SITAGLIPTIN AND METFORMIN HYDROCHLORIDE 1000; 50 MG/1; MG/1
TABLET, FILM COATED ORAL
Qty: 180 TABLET | Refills: 1 | Status: SHIPPED | OUTPATIENT
Start: 2020-07-20 | End: 2020-11-30 | Stop reason: SDUPTHER

## 2020-07-20 RX ORDER — BENAZEPRIL HYDROCHLORIDE 20 MG/1
TABLET ORAL
Qty: 90 TABLET | Refills: 1 | Status: SHIPPED | OUTPATIENT
Start: 2020-07-20 | End: 2020-11-30 | Stop reason: SDUPTHER

## 2020-07-20 RX ORDER — CARVEDILOL 12.5 MG/1
TABLET ORAL
Qty: 180 TABLET | Refills: 1 | Status: SHIPPED | OUTPATIENT
Start: 2020-07-20 | End: 2020-11-30 | Stop reason: SDUPTHER

## 2020-07-20 RX ORDER — AMLODIPINE BESYLATE 10 MG/1
TABLET ORAL
Qty: 90 TABLET | Refills: 1 | Status: SHIPPED | OUTPATIENT
Start: 2020-07-20 | End: 2020-11-30 | Stop reason: SDUPTHER

## 2020-07-20 ASSESSMENT — ENCOUNTER SYMPTOMS
ALLERGIC/IMMUNOLOGIC NEGATIVE: 1
RESPIRATORY NEGATIVE: 1
EYES NEGATIVE: 1
GASTROINTESTINAL NEGATIVE: 1

## 2020-07-20 NOTE — ASSESSMENT & PLAN NOTE
Patient has diabetes mellitus. Patient states his blood sugars are running very well at home. Continue Janumet 50/1000 twice a day and follow a diet.   Will recheck hemoglobin A1c in 3 months

## 2020-07-20 NOTE — ASSESSMENT & PLAN NOTE
Patient has hypertension. Blood pressure is normal at home. Continue amlodipine, benazepril and Coreg.   Follow low-salt diet

## 2020-07-20 NOTE — ASSESSMENT & PLAN NOTE
Patient had multiple lung nodules. Small 2-4 mm nodules on CT in 10/16. F/u in one year. Seen Dr Radha Johnson. F/u 9/2017 : stable nodules. Benign based on stability criteria.  No f/u needed  CT chest 7/2020 shows a stable pulmonary nodules no need for further work-up

## 2020-07-20 NOTE — PROGRESS NOTES
LANCETS MISC Testing once daily, DX=E11.9 Yes Lon Cordoba MD   blood glucose monitor strips Please dispense One Touch Test strips, testing once daily, DX=E11.9 Yes Lon Cordoba MD   Alcohol Swabs (ALCOHOL PREP) 70 % PADS Using once daily and prn, DX=E11.9 Yes Lon Cordoba MD   aspirin 81 MG tablet Take 81 mg by mouth daily. Yes Historical Provider, MD   atorvastatin (LIPITOR) 40 MG tablet TAKE ONE TABLET BY MOUTH DAILY  Patient not taking: Reported on 2020  oLn Cordoba MD       Social History     Tobacco Use    Smoking status: Former Smoker     Packs/day: 0.10     Years: 2.00     Pack years: 0.20     Types: Cigarettes     Last attempt to quit: 1983     Years since quittin.5    Smokeless tobacco: Never Used   Substance Use Topics    Alcohol use: No     Alcohol/week: 0.0 standard drinks    Drug use: No        Allergies   Allergen Reactions    Etodolac Other (See Comments)     Drop in bp     Efudex [Fluorouracil]    ,   Past Medical History:   Diagnosis Date    Adhesive capsulitis of shoulder 2011    Anxiety     h/o anxiety workman comp related. sees a psychologist    Ascending aortic aneurysm ([address] 2016: CT chest :Mild aneurysmal dilation of the ascending aorta measuring up to 4.1 cm. Recheck in one year    Backache, unspecified 2013    Sees dr. Sandy Lacy    Chronic back pain     Colonoscopy refused     Depression     Diabetes mellitus ([facility]     ED (erectile dysfunction) 2014    H/O CHF     resolved  EF 25-30 % improved 60% in 3/05    H/O echocardiogram     EF 60 %    Hyperlipidemia     Hypertension     Legionella pneumonia ([address] 10/27/2016    10/16. Resolved. Seen Jules Every.  Limitation due to disability     due to anxiety and agrophobia    Liver dysfunction     blood w/u neg.  US fatty liver    Liver dysfunction 10/27/2016    Liver test were abnormal with legionelle pneumonia Now back to normal.    Multiple lung nodules on CT 2016 Small 2-4 mm nodules on CT in 10/16. F/u in one year. Seen Dr [place].  Osteoarthritis     spine, hip and foot per pt. sees Dr. Louise Cardenas    Perirectal abscess     resovled   ,   Past Surgical History:   Procedure Laterality Date    APPENDECTOMY      CHOLECYSTECTOMY, LAPAROSCOPIC      GALLBLADDER SURGERY      PERICARDIUM SURGERY      TONSILLECTOMY      WRIST FRACTURE SURGERY     ,   Social History     Tobacco Use    Smoking status: Former Smoker     Packs/day: 0.10     Years: 2.00     Pack years: 0.20     Types: Cigarettes     Last attempt to quit: 1983     Years since quittin.5    Smokeless tobacco: Never Used   Substance Use Topics    Alcohol use: No     Alcohol/week: 0.0 standard drinks    Drug use: No       PHYSICAL EXAMINATION:  [ INSTRUCTIONS:  \"[x]\" Indicates a positive item  \"[]\" Indicates a negative item  -- DELETE ALL ITEMS NOT EXAMINED]  Vital Signs: (As obtained by patient/caregiver or practitioner observation)    Blood pressure-  Heart rate-    Respiratory rate-    Temperature-  Pulse oximetry-     Constitutional: [x] Appears well-developed and well-nourished [x] No apparent distress      [] Abnormal-   Mental status  [x] Alert and awake  [x] Oriented to person/place/time [x]Able to follow commands      Eyes:  EOM    [x]  Normal  [] Abnormal-  Sclera  [x]  Normal  [] Abnormal -         Discharge [x]  None visible  [] Abnormal -    HENT:   [x] Normocephalic, atraumatic.   [] Abnormal   [] Mouth/Throat: Mucous membranes are moist.     External Ears [x] Normal  [] Abnormal-     Neck: [] No visualized mass     Pulmonary/Chest: [x] Respiratory effort normal.  [x] No visualized signs of difficulty breathing or respiratory distress        [] Abnormal-      Musculoskeletal:   [] Normal gait with no signs of ataxia         [x] Normal range of motion of neck        [] Abnormal-       Neurological:        [x] No Facial Asymmetry (Cranial nerve 7 motor function) (limited exam to video visit)          [x] No gaze palsy        [] Abnormal-         Skin:        [x] No significant exanthematous lesions or discoloration noted on facial skin         [] Abnormal-            Psychiatric:       [x] Normal Affect [x] No Hallucinations        [] Abnormal-     Other pertinent observable physical exam findings-     ASSESSMENT/PLAN:  Essential hypertension  Patient has hypertension. Blood pressure is normal at home. Continue amlodipine, benazepril and Coreg. Follow low-salt diet    Mixed hyperlipidemia  Patient has hyperlipidemia but patient stopped taking the atorvastatin. Patient was advised to resume taking atorvastatin. Recheck liver function test in 3 months    Multiple lung nodules on CT  Patient had multiple lung nodules. Small 2-4 mm nodules on CT in 10/16. F/u in one year. Seen Dr Sadi Chavez. F/u 9/2017 : stable nodules. Benign based on stability criteria. No f/u needed  CT chest 7/2020 shows a stable pulmonary nodules no need for further work-up    Osteoarthritis  Patient has osteoarthritis that is stable does not bothering him. Type 2 diabetes mellitus without complication ([facility] [address]  Patient has diabetes mellitus. Patient states his blood sugars are running very well at home. Continue Janumet 50/1000 twice a day and follow a diet. Will recheck hemoglobin A1c in 3 months    Anxiety  Anxiety is stable and patient sees psychologist on a regular basis    Ascending aortic aneurysm [facility])  Patient had a sending aortic aneurysm which is a stable at 4 cm. Orders Placed This Encounter   Procedures    Hemoglobin A1C    Comprehensive Metabolic Panel    Lipid, Fasting    Psa screening     Patient has refused colonoscopy    Return in about 3 months (around 10/20/2020). Velia Triana is a 60 y.o. male being evaluated by a Virtual Visit (video visit) encounter to address concerns as mentioned above. A caregiver was present when appropriate.  Due to this being a TeleHealth encounter (During COVID-19 public health emergency), evaluation of the following organ systems was limited: Vitals/Constitutional/EENT/Resp/CV/GI//MS/Neuro/Skin/Heme-Lymph-Imm. Pursuant to the emergency declaration under the [address] [place] [address], [address] [place] [address] authority and the [employer] Act, this Virtual Visit was conducted with patient's (and/or legal guardian's) consent, to reduce the patient's risk of exposure to COVID-19 and provide necessary medical care. The patient (and/or legal guardian) has also been advised to contact this office for worsening conditions or problems, and seek emergency medical treatment and/or call 911 if deemed necessary. Patient identification was verified at the start of the visit: Yes    Total time spent on this encounter: Not billed by time    Services were provided through a video synchronous discussion virtually to substitute for in-person clinic visit. Patient and provider were located at their individual homes. --Zhane Carbajal MD on 7/20/2020 at 4:08 PM    An electronic signature was used to authenticate this note.

## 2020-08-04 ENCOUNTER — TELEPHONE (OUTPATIENT)
Dept: INTERNAL MEDICINE CLINIC | Age: 60
End: 2020-08-04

## 2020-08-06 RX ORDER — GLUCOSAMINE HCL/CHONDROITIN SU 500-400 MG
CAPSULE ORAL
Qty: 100 STRIP | Refills: 1 | Status: CANCELLED | OUTPATIENT
Start: 2020-08-06

## 2020-10-23 ENCOUNTER — VIRTUAL VISIT (OUTPATIENT)
Dept: INTERNAL MEDICINE CLINIC | Age: 60
End: 2020-10-23
Payer: MEDICARE

## 2020-10-23 ENCOUNTER — HOSPITAL ENCOUNTER (OUTPATIENT)
Age: 60
Discharge: HOME | End: 2020-10-23
Payer: MEDICARE

## 2020-10-23 VITALS — HEIGHT: 64 IN | WEIGHT: 156 LBS | BODY MASS INDEX: 26.63 KG/M2

## 2020-10-23 PROBLEM — M54.50 CHRONIC MIDLINE LOW BACK PAIN WITHOUT SCIATICA: Status: ACTIVE | Noted: 2020-10-23

## 2020-10-23 PROBLEM — G89.29 CHRONIC MIDLINE LOW BACK PAIN WITHOUT SCIATICA: Status: ACTIVE | Noted: 2020-10-23

## 2020-10-23 LAB
ALBUMIN SERPL-MCNC: 4.7 GM/DL (ref 3.4–5)
ALP BLD-CCNC: 69 IU/L (ref 40–129)
ALT SERPL-CCNC: 20 U/L (ref 10–40)
ANION GAP SERPL CALCULATED.3IONS-SCNC: 9 MMOL/L (ref 4–16)
AST SERPL-CCNC: 17 IU/L (ref 15–37)
BASOPHILS ABSOLUTE: 0 K/CU MM
BASOPHILS RELATIVE PERCENT: 0.3 % (ref 0–1)
BILIRUB SERPL-MCNC: 0.4 MG/DL (ref 0–1)
BUN BLDV-MCNC: 13 MG/DL (ref 6–23)
CALCIUM SERPL-MCNC: 9.7 MG/DL (ref 8.3–10.6)
CHLORIDE BLD-SCNC: 103 MMOL/L (ref 99–110)
CHOLESTEROL, FASTING: 266 MG/DL
CO2: 30 MMOL/L (ref 21–32)
CREAT SERPL-MCNC: 1.2 MG/DL (ref 0.9–1.3)
CREATININE URINE: 138.8 MG/DL (ref 39–259)
DIFFERENTIAL TYPE: ABNORMAL
EOSINOPHILS ABSOLUTE: 0.2 K/CU MM
EOSINOPHILS RELATIVE PERCENT: 2.8 % (ref 0–3)
ESTIMATED AVERAGE GLUCOSE: 171 MG/DL
GFR AFRICAN AMERICAN: >60 ML/MIN/1.73M2
GFR NON-AFRICAN AMERICAN: >60 ML/MIN/1.73M2
GLUCOSE FASTING: 133 MG/DL (ref 70–99)
HBA1C MFR BLD: 7.6 % (ref 4.2–6.3)
HCT VFR BLD CALC: 46.1 % (ref 42–52)
HDLC SERPL-MCNC: 40 MG/DL
HEMOGLOBIN: 15.7 GM/DL (ref 13.5–18)
IMMATURE NEUTROPHIL %: 0.3 % (ref 0–0.43)
LDL CHOLESTEROL DIRECT: 197 MG/DL
LYMPHOCYTES ABSOLUTE: 1.7 K/CU MM
LYMPHOCYTES RELATIVE PERCENT: 25.4 % (ref 24–44)
MCH RBC QN AUTO: 30.2 PG (ref 27–31)
MCHC RBC AUTO-ENTMCNC: 34.1 % (ref 32–36)
MCV RBC AUTO: 88.7 FL (ref 78–100)
MICROALBUMIN/CREAT 24H UR: 1.5 MG/DL
MICROALBUMIN/CREAT UR-RTO: 10.8 MG/G CREAT (ref 0–30)
MONOCYTES ABSOLUTE: 0.8 K/CU MM
MONOCYTES RELATIVE PERCENT: 11.9 % (ref 0–4)
PDW BLD-RTO: 13.2 % (ref 11.7–14.9)
PLATELET # BLD: 340 K/CU MM (ref 140–440)
PMV BLD AUTO: 9.8 FL (ref 7.5–11.1)
POTASSIUM SERPL-SCNC: 4.7 MMOL/L (ref 3.5–5.1)
PROSTATE SPECIFIC ANTIGEN: 1.21 NG/ML (ref 0–4)
RBC # BLD: 5.2 M/CU MM (ref 4.6–6.2)
SEGMENTED NEUTROPHILS ABSOLUTE COUNT: 4 K/CU MM
SEGMENTED NEUTROPHILS RELATIVE PERCENT: 59.3 % (ref 36–66)
SODIUM BLD-SCNC: 142 MMOL/L (ref 135–145)
TOTAL IMMATURE NEUTOROPHIL: 0.02 K/CU MM
TOTAL PROTEIN: 7.5 GM/DL (ref 6.4–8.2)
TRIGLYCERIDE, FASTING: 180 MG/DL
WBC # BLD: 6.8 K/CU MM (ref 4–10.5)

## 2020-10-23 PROCEDURE — 82043 UR ALBUMIN QUANTITATIVE: CPT

## 2020-10-23 PROCEDURE — 36415 COLL VENOUS BLD VENIPUNCTURE: CPT

## 2020-10-23 PROCEDURE — 85025 COMPLETE CBC W/AUTO DIFF WBC: CPT

## 2020-10-23 PROCEDURE — 80053 COMPREHEN METABOLIC PANEL: CPT

## 2020-10-23 PROCEDURE — 83036 HEMOGLOBIN GLYCOSYLATED A1C: CPT

## 2020-10-23 PROCEDURE — G0438 PPPS, INITIAL VISIT: HCPCS | Performed by: INTERNAL MEDICINE

## 2020-10-23 PROCEDURE — G0103 PSA SCREENING: HCPCS

## 2020-10-23 PROCEDURE — 82570 ASSAY OF URINE CREATININE: CPT

## 2020-10-23 PROCEDURE — 99214 OFFICE O/P EST MOD 30 MIN: CPT | Performed by: INTERNAL MEDICINE

## 2020-10-23 PROCEDURE — 80061 LIPID PANEL: CPT

## 2020-10-23 RX ORDER — ATORVASTATIN CALCIUM 40 MG/1
TABLET, FILM COATED ORAL
Qty: 90 TABLET | Refills: 1 | Status: SHIPPED | OUTPATIENT
Start: 2020-10-23 | End: 2021-02-10

## 2020-10-23 ASSESSMENT — PATIENT HEALTH QUESTIONNAIRE - PHQ9
SUM OF ALL RESPONSES TO PHQ9 QUESTIONS 1 & 2: 0
SUM OF ALL RESPONSES TO PHQ QUESTIONS 1-9: 0
2. FEELING DOWN, DEPRESSED OR HOPELESS: 0
1. LITTLE INTEREST OR PLEASURE IN DOING THINGS: 0
SUM OF ALL RESPONSES TO PHQ QUESTIONS 1-9: 0
SUM OF ALL RESPONSES TO PHQ QUESTIONS 1-9: 0

## 2020-10-23 ASSESSMENT — ENCOUNTER SYMPTOMS
ALLERGIC/IMMUNOLOGIC NEGATIVE: 1
GASTROINTESTINAL NEGATIVE: 1
RESPIRATORY NEGATIVE: 1
EYES NEGATIVE: 1

## 2020-10-23 ASSESSMENT — LIFESTYLE VARIABLES: HOW OFTEN DO YOU HAVE A DRINK CONTAINING ALCOHOL: 0

## 2020-10-23 NOTE — PATIENT INSTRUCTIONS
Personalized Preventive Plan for Ginna Freedman - 10/23/2020  Medicare offers a range of preventive health benefits. Some of the tests and screenings are paid in full while other may be subject to a deductible, co-insurance, and/or copay. Some of these benefits include a comprehensive review of your medical history including lifestyle, illnesses that may run in your family, and various assessments and screenings as appropriate. After reviewing your medical record and screening and assessments performed today your provider may have ordered immunizations, labs, imaging, and/or referrals for you. A list of these orders (if applicable) as well as your Preventive Care list are included within your After Visit Summary for your review. Other Preventive Recommendations:    · A preventive eye exam performed by an eye specialist is recommended every 1-2 years to screen for glaucoma; cataracts, macular degeneration, and other eye disorders. · A preventive dental visit is recommended every 6 months. · Try to get at least 150 minutes of exercise per week or 10,000 steps per day on a pedometer . · Order or download the FREE \"Exercise & Physical Activity: Your Everyday Guide\" from [employer]. Call 2-933.479.2012 or search [employer] online. · You need 1265-6442 mg of calcium and 2538-1348 IU of vitamin D per day. It is possible to meet your calcium requirement with diet alone, but a vitamin D supplement is usually necessary to meet this goal.  · When exposed to the sun, use a sunscreen that protects against both UVA and UVB radiation with an SPF of 30 or greater. Reapply every 2 to 3 hours or after sweating, drying off with a towel, or swimming. · Always wear a seat belt when traveling in a car. Always wear a helmet when riding a bicycle or motorcycle.

## 2020-10-23 NOTE — ASSESSMENT & PLAN NOTE
Patient has diabetes mellitus blood sugars are in good control at home. Continue Janumet  mg twice a day. Follow diet.   Get hemoglobin A1c done

## 2020-10-23 NOTE — ASSESSMENT & PLAN NOTE
Patient has hyperlipidemia and is on atorvastatin 40 mg daily. Follow low-cholesterol diet. Advised patient to get lipid profile done.

## 2020-10-23 NOTE — PROGRESS NOTES
10/23/2020    TELEHEALTH EVALUATION -- Audio/Visual (During OKSS-75 public health emergency)    HPI:    Aurora Cabrera (:  1960) has requested an audio/video evaluation for the following concern(s):    Patient is having virtual video visit through [employer]. me doxy. me  Patient is at home  Chief Complaint   Patient presents with    Back Pain     right side,comes and goes, no fever, no problems with urination    Other     did not get labs drawn yet    Medication Refill    Diabetes     BG ranges 110-115 mg/dL     Patient also discussed following concerns  Diabetes  Hypertension  Hyperlipidemia  Lower back pain    Patient has history of diabetes mellitus since . He is taking Janumet  mg twice a day. Patient states his blood sugars ranging between 1 10-1 15. No hypoglycemia. No tingling numbness or weakness of the arms or legs. Patient has hypertension. Taking medications No headaches, no chest pain, no palpitations and no dizziness. Patient has hyperlipidemia. Taking medications. No abdominal pain, no nausea or vomiting. No myalgias. Complaining of lower back pain for several months. He is not sure whether the pain is coming from his spine arthritis. He is not taking any medications for that. He does get better. No hematuria. No urinary symptoms. He does not want any test for that. Patient did not get the blood test done as ordered    Review of Systems   Constitutional: Negative. HENT: Negative. Eyes: Negative. Respiratory: Negative. Gastrointestinal: Negative. Endocrine: Negative. Genitourinary: Negative. Musculoskeletal: Negative. Skin: Negative. Allergic/Immunologic: Negative. Neurological: Negative. Hematological: Negative. Psychiatric/Behavioral: Negative. Prior to Visit Medications    Medication Sig Taking?  Authorizing Provider   BLOOD GLUCOSE MONITOR  Yes Historical Provider, MD   blood glucose monitor kit and supplies 1 kit by Other Chronic back pain     Colonoscopy refused     Depression     Diabetes mellitus ([facility]     ED (erectile dysfunction) 2014    H/O CHF     resolved  EF 25-30 % improved 60% in 3/05    H/O echocardiogram     EF 60 %    Hyperlipidemia     Hypertension     Legionella pneumonia ([facility] [address] 10/27/2016    10/16. Resolved. Seen Austin Medina.  Limitation due to disability     due to anxiety and agrophobia    Liver dysfunction     blood w/u neg. US fatty liver    Liver dysfunction 10/27/2016    Liver test were abnormal with legionelle pneumonia Now back to normal.    Multiple lung nodules on CT 2016    Small 2-4 mm nodules on CT in 10/16. F/u in one year. Seen Dr Austin Medina.     Osteoarthritis     spine, hip and foot per pt. sees Dr. Yusef Alexandra    Perirectal abscess     resovled   ,   Past Surgical History:   Procedure Laterality Date    APPENDECTOMY      CHOLECYSTECTOMY, LAPAROSCOPIC      GALLBLADDER SURGERY      PERICARDIUM SURGERY      TONSILLECTOMY      WRIST FRACTURE SURGERY     ,   Social History     Tobacco Use    Smoking status: Former Smoker     Packs/day: 0.10     Years: 2.00     Pack years: 0.20     Types: Cigarettes     Last attempt to quit: 1983     Years since quittin.8    Smokeless tobacco: Never Used   Substance Use Topics    Alcohol use: No     Alcohol/week: 0.0 standard drinks    Drug use: No       PHYSICAL EXAMINATION:  [ INSTRUCTIONS:  \"[x]\" Indicates a positive item  \"[]\" Indicates a negative item  -- DELETE ALL ITEMS NOT EXAMINED]  Vital Signs: (As obtained by patient/caregiver or practitioner observation)    Blood pressure-120/70 heart rate-    Respiratory rate-    Temperature-  Pulse oximetry-     Constitutional: [x] Appears well-developed and well-nourished [] No apparent distress      [] Abnormal-   Mental status  [x] Alert and awake  [x] Oriented to person/place/time []Able to follow commands      Eyes:  EOM    [x]  Normal  [] Abnormal-  Sclera  [x]  Normal  [] Abnormal -         Discharge []  None visible  [] Abnormal -    HENT:   [x] Normocephalic, atraumatic. [] Abnormal   [x] Mouth/Throat: Mucous membranes are moist.     External Ears [x] Normal  [] Abnormal-     Neck: [x] No visualized mass     Pulmonary/Chest: [x] Respiratory effort normal.  [x] No visualized signs of difficulty breathing or respiratory distress        [] Abnormal-      Musculoskeletal:   [x] Normal gait with no signs of ataxia         [x] Normal range of motion of neck        [] Abnormal-       Neurological:        [x] No Facial Asymmetry (Cranial nerve 7 motor function) (limited exam to video visit)          [x] No gaze palsy        [] Abnormal-         Skin:        [x] No significant exanthematous lesions or discoloration noted on facial skin         [] Abnormal-            Psychiatric:       [x] Normal Affect [x] No Hallucinations        [] Abnormal-     Other pertinent observable physical exam findings-     ASSESSMENT/PLAN:  Type 2 diabetes mellitus without complication ([facility] [address]  Patient has diabetes mellitus blood sugars are in good control at home. Continue Janumet  mg twice a day. Follow diet. Get hemoglobin A1c done    Essential hypertension  Hypertension is in good control. /70. Continue amlodipine, Benzapril and Coreg. Follow low-salt diet    Anxiety  Patient has history of anxiety and is still see psychologist every 3 months    Mixed hyperlipidemia  Patient has hyperlipidemia and is on atorvastatin 40 mg daily. Follow low-cholesterol diet. Advised patient to get lipid profile done. Osteoarthritis  Patient has arthritis of different joints but is in control. Now he is having some low back pain. Advised patient to take Tylenol and some over-the-counter creams. Ascending aortic aneurysm [facility])  Ascending aortic aneurysm stable on the CT scan of July 2028 was 4 cm.     Chronic midline low back pain without sciatica  Patient states he has lower back pain for a few months  Not taking any medications  Advised patient to take Tylenol and over-the-counter creams  If not improved needs to get work-up done. Blood tests are already in the epic  Advised patient to get it done he will go to [facility] for the lab draw  Needs to get hemoglobin A1c, CMP, lipid profile, PSA  Return to office in 3 months. Orders Placed This Encounter   Procedures    CBC Auto Differential    Comprehensive Metabolic Panel    Hemoglobin A1C    Microalbumin / Creatinine Urine Ratio    Lipid, Fasting    Psa screening           Rafael Sheldon is a 60 y.o. male being evaluated by a Virtual Visit (video visit) encounter to address concerns as mentioned above. A caregiver was present when appropriate. Due to this being a TeleHealth encounter (During EDRDF-61 public health emergency), evaluation of the following organ systems was limited: Vitals/Constitutional/EENT/Resp/CV/GI//MS/Neuro/Skin/Heme-Lymph-Imm. Pursuant to the emergency declaration under the [facility] [address] authority and the [employer] Act, this Virtual Visit was conducted with patient's (and/or legal guardian's) consent, to reduce the patient's risk of exposure to COVID-19 and provide necessary medical care. The patient (and/or legal guardian) has also been advised to contact this office for worsening conditions or problems, and seek emergency medical treatment and/or call 911 if deemed necessary. Patient identification was verified at the start of the visit: Yes    Total time spent on this encounter: Not billed by time    Services were provided through a video synchronous discussion virtually to substitute for in-person clinic visit. Patient and provider were located at their individual homes. --Jairon Parks MD on 10/23/2020 at 8:24 AM    An electronic signature was used to authenticate this note.

## 2020-10-23 NOTE — ASSESSMENT & PLAN NOTE
Hypertension is in good control. /70. Continue amlodipine, Benzapril and Coreg.   Follow low-salt diet

## 2020-10-23 NOTE — ASSESSMENT & PLAN NOTE
Patient has arthritis of different joints but is in control. Now he is having some low back pain. Advised patient to take Tylenol and some over-the-counter creams.

## 2020-10-23 NOTE — ASSESSMENT & PLAN NOTE
Patient states he has lower back pain for a few months  Not taking any medications  Advised patient to take Tylenol and over-the-counter creams  If not improved needs to get work-up done.

## 2020-10-23 NOTE — PROGRESS NOTES
Diagnosis Date    Adhesive capsulitis of shoulder 4/23/2011    Anxiety     h/o anxiety workman comp related. sees a psychologist    Ascending aortic aneurysm ([facility] [address] 9/9/2016 8/16: CT chest :Mild aneurysmal dilation of the ascending aorta measuring up to 4.1 cm. Recheck in one year    Backache, unspecified 7/24/2013    Sees dr. Ozzie Alfred    Chronic back pain     Colonoscopy refused     Depression     Diabetes mellitus ([facility] [address]     ED (erectile dysfunction) 1/25/2014    H/O CHF 2003    resolved  EF 25-30 % improved 60% in 3/05    H/O echocardiogram 2011    EF 60 %    Hyperlipidemia     Hypertension     Legionella pneumonia ([facility] [address] 10/27/2016    10/16. Resolved. Seen Asif Mota.  Limitation due to disability     due to anxiety and agrophobia    Liver dysfunction     blood w/u neg. US fatty liver    Liver dysfunction 10/27/2016    Liver test were abnormal with legionelle pneumonia Now back to normal.    Multiple lung nodules on CT 11/29/2016    Small 2-4 mm nodules on CT in 10/16. F/u in one year. Seen Dr Asif Mota.     Osteoarthritis     spine, hip and foot per pt. sees Dr. Hernan Hull    Perirectal abscess     resovled       Past Surgical History:   Procedure Laterality Date    APPENDECTOMY      CHOLECYSTECTOMY, LAPAROSCOPIC  05/06    GALLBLADDER SURGERY      PERICARDIUM SURGERY      TONSILLECTOMY      WRIST FRACTURE SURGERY           Family History   Problem Relation Age of Onset    Heart Disease Mother     Coronary Art Dis Mother     Diabetes Mother         diabetes mellitus    Stroke Father     Heart Attack Father        CareTeam (Including outside providers/suppliers regularly involved in providing care):   Patient Care Team:  Bernard Ramsey MD as PCP - Kelton Downs MD as PCP - Davide Ferrerled Provider    Wt Readings from Last 3 Encounters:   10/23/20 156 lb (70.8 kg)   03/24/20 161 lb 9.6 oz (73.3 kg)   01/31/20 157 lb 12.8 oz (71.6 kg)     Vitals:    10/23/20 1038   Weight: 156 lb (70.8 kg)   Height: 5' 4\" (1.626 m)     Body mass index is 26.78 kg/m². Based upon direct observation of the patient, evaluation of cognition reveals recent and remote memory intact. Patient's complete Health Risk Assessment and screening values have been reviewed and are found in Flowsheets. The following problems were reviewed today and where indicated follow up appointments were made and/or referrals ordered. Positive Risk Factor Screenings with Interventions:       General Health and ACP:  General  In general, how would you say your health is?: Good  In the past 7 days, have you experienced any of the following?  New or Increased Pain, New or Increased Fatigue, Loneliness, Social Isolation, Stress or Anger?: (!) New or Increased Pain  Do you get the social and emotional support that you need?: Yes  Do you have a Living Will?: Yes  Advance Directives     Power of  Living Will ACP-Advance Directive ACP-Power of     Not on File Not on File Filed [address] [facility] Risk Interventions:  · Pain issues: Patient stated he has been having back pain and spoke with PCP on a video visit this morning and tests are getting run today to rule out kidney stones  · No Living Will: ACP documents already completed- patient asked to provide copy to the office    Health Habits/Nutrition:  Health Habits/Nutrition  Do you exercise for at least 20 minutes 2-3 times per week?: (!) No  Have you lost any weight without trying in the past 3 months?: No  Do you eat fewer than 2 meals per day?: No  Have you seen a dentist within the past year?: (!) No  Body mass index: (!) 26.77  Health Habits/Nutrition Interventions:  · Inadequate physical activity:  patient is not ready to increase his/her physical activity level at this time  · Nutritional issues:  patient is not ready to address his/her nutritional/weight issues at this time  · Dental exam overdue:  patient declines dental evaluation stating he does

## 2020-11-23 ENCOUNTER — HOSPITAL ENCOUNTER (OUTPATIENT)
Age: 60
Discharge: HOME | End: 2020-11-23
Payer: MEDICARE

## 2020-11-23 LAB
ALBUMIN SERPL-MCNC: 4.7 GM/DL (ref 3.4–5)
ALP BLD-CCNC: 83 IU/L (ref 40–129)
ALT SERPL-CCNC: 26 U/L (ref 10–40)
ANION GAP SERPL CALCULATED.3IONS-SCNC: 12 MMOL/L (ref 4–16)
AST SERPL-CCNC: 24 IU/L (ref 15–37)
BASOPHILS ABSOLUTE: 0 K/CU MM
BASOPHILS RELATIVE PERCENT: 0.5 % (ref 0–1)
BILIRUB SERPL-MCNC: 0.6 MG/DL (ref 0–1)
BUN BLDV-MCNC: 19 MG/DL (ref 6–23)
CALCIUM SERPL-MCNC: 9.3 MG/DL (ref 8.3–10.6)
CHLORIDE BLD-SCNC: 102 MMOL/L (ref 99–110)
CHOLESTEROL, FASTING: 157 MG/DL
CO2: 28 MMOL/L (ref 21–32)
CREAT SERPL-MCNC: 1.1 MG/DL (ref 0.9–1.3)
CREATININE URINE: 355.7 MG/DL (ref 39–259)
DIFFERENTIAL TYPE: ABNORMAL
EOSINOPHILS ABSOLUTE: 0.1 K/CU MM
EOSINOPHILS RELATIVE PERCENT: 2.2 % (ref 0–3)
ESTIMATED AVERAGE GLUCOSE: 154 MG/DL
GFR AFRICAN AMERICAN: >60 ML/MIN/1.73M2
GFR NON-AFRICAN AMERICAN: >60 ML/MIN/1.73M2
GLUCOSE BLD-MCNC: 117 MG/DL (ref 70–99)
HBA1C MFR BLD: 7 % (ref 4.2–6.3)
HCT VFR BLD CALC: 45 % (ref 42–52)
HDLC SERPL-MCNC: 29 MG/DL
HEMOGLOBIN: 14.8 GM/DL (ref 13.5–18)
IMMATURE NEUTROPHIL %: 0.2 % (ref 0–0.43)
LDL CHOLESTEROL DIRECT: 101 MG/DL
LYMPHOCYTES ABSOLUTE: 1.4 K/CU MM
LYMPHOCYTES RELATIVE PERCENT: 21.8 % (ref 24–44)
MCH RBC QN AUTO: 29.7 PG (ref 27–31)
MCHC RBC AUTO-ENTMCNC: 32.9 % (ref 32–36)
MCV RBC AUTO: 90.2 FL (ref 78–100)
MICROALBUMIN/CREAT 24H UR: 3 MG/DL
MICROALBUMIN/CREAT UR-RTO: 8.4 MG/G CREAT (ref 0–30)
MONOCYTES ABSOLUTE: 0.8 K/CU MM
MONOCYTES RELATIVE PERCENT: 12.2 % (ref 0–4)
PDW BLD-RTO: 13.2 % (ref 11.7–14.9)
PLATELET # BLD: 328 K/CU MM (ref 140–440)
PMV BLD AUTO: 9.8 FL (ref 7.5–11.1)
POTASSIUM SERPL-SCNC: 4.4 MMOL/L (ref 3.5–5.1)
PROSTATE SPECIFIC ANTIGEN: 1.02 NG/ML (ref 0–4)
RBC # BLD: 4.99 M/CU MM (ref 4.6–6.2)
SEGMENTED NEUTROPHILS ABSOLUTE COUNT: 4 K/CU MM
SEGMENTED NEUTROPHILS RELATIVE PERCENT: 63.1 % (ref 36–66)
SODIUM BLD-SCNC: 142 MMOL/L (ref 135–145)
TOTAL IMMATURE NEUTOROPHIL: 0.01 K/CU MM
TOTAL PROTEIN: 7.2 GM/DL (ref 6.4–8.2)
TRIGLYCERIDE, FASTING: 195 MG/DL
WBC # BLD: 6.4 K/CU MM (ref 4–10.5)

## 2020-11-23 PROCEDURE — 82043 UR ALBUMIN QUANTITATIVE: CPT

## 2020-11-23 PROCEDURE — 82570 ASSAY OF URINE CREATININE: CPT

## 2020-11-23 PROCEDURE — 83036 HEMOGLOBIN GLYCOSYLATED A1C: CPT

## 2020-11-23 PROCEDURE — G0103 PSA SCREENING: HCPCS

## 2020-11-23 PROCEDURE — 80061 LIPID PANEL: CPT

## 2020-11-23 PROCEDURE — 80053 COMPREHEN METABOLIC PANEL: CPT

## 2020-11-23 PROCEDURE — 36415 COLL VENOUS BLD VENIPUNCTURE: CPT

## 2020-11-23 PROCEDURE — 85025 COMPLETE CBC W/AUTO DIFF WBC: CPT

## 2020-11-30 ENCOUNTER — OFFICE VISIT (OUTPATIENT)
Dept: INTERNAL MEDICINE CLINIC | Age: 60
End: 2020-11-30
Payer: MEDICARE

## 2020-11-30 ENCOUNTER — TELEPHONE (OUTPATIENT)
Dept: INTERNAL MEDICINE CLINIC | Age: 60
End: 2020-11-30

## 2020-11-30 VITALS
DIASTOLIC BLOOD PRESSURE: 64 MMHG | TEMPERATURE: 98.4 F | BODY MASS INDEX: 27.57 KG/M2 | RESPIRATION RATE: 16 BRPM | HEART RATE: 68 BPM | OXYGEN SATURATION: 98 % | WEIGHT: 160.6 LBS | SYSTOLIC BLOOD PRESSURE: 118 MMHG

## 2020-11-30 PROCEDURE — 99213 OFFICE O/P EST LOW 20 MIN: CPT | Performed by: INTERNAL MEDICINE

## 2020-11-30 PROCEDURE — 3051F HG A1C>EQUAL 7.0%<8.0%: CPT | Performed by: INTERNAL MEDICINE

## 2020-11-30 RX ORDER — AMLODIPINE BESYLATE 10 MG/1
TABLET ORAL
Qty: 90 TABLET | Refills: 1 | Status: SHIPPED | OUTPATIENT
Start: 2020-11-30 | End: 2021-05-07 | Stop reason: SDUPTHER

## 2020-11-30 RX ORDER — SITAGLIPTIN AND METFORMIN HYDROCHLORIDE 1000; 50 MG/1; MG/1
TABLET, FILM COATED ORAL
Qty: 180 TABLET | Refills: 1 | Status: SHIPPED | OUTPATIENT
Start: 2020-11-30 | End: 2021-05-07 | Stop reason: SDUPTHER

## 2020-11-30 RX ORDER — CARVEDILOL 12.5 MG/1
TABLET ORAL
Qty: 180 TABLET | Refills: 1 | Status: SHIPPED | OUTPATIENT
Start: 2020-11-30 | End: 2021-05-07 | Stop reason: SDUPTHER

## 2020-11-30 RX ORDER — BENAZEPRIL HYDROCHLORIDE 20 MG/1
TABLET ORAL
Qty: 90 TABLET | Refills: 1 | Status: SHIPPED | OUTPATIENT
Start: 2020-11-30 | End: 2021-04-20

## 2020-11-30 NOTE — PROGRESS NOTES
Jessica Cooper  Patient's  is 1960  Seen in office on 2020      SUBJECTIVE:  Bryson scott 63 y. o.year old male presents today   Chief Complaint   Patient presents with    Back Pain     x 1 month, hurts to move. , thorasic area    Results     lab    Medication Refill     Pt states he has pain in the posterior chest on the right side close to spine  Symptoms x 1 month  No falls. No injury  No hematuria  No rash. Pain on lifting arm up. CT chest ion 2020 was normal   Taking medications regularly. No side effects noted. Review of Systems    OBJECTIVE: /64   Pulse 68   Temp 98.4 °F (36.9 °C) (Oral)   Resp 16   Wt 160 lb 9.6 oz (72.8 kg)   SpO2 98%   BMI 27.57 kg/m²     Wt Readings from Last 3 Encounters:   20 160 lb 9.6 oz (72.8 kg)   10/23/20 156 lb (70.8 kg)   20 161 lb 9.6 oz (73.3 kg)        Patient was seen taking COVID-19 precautions. Face mask, gloves and eyes protectors were used. Patient also wore facemask. GENERAL: - Alert, oriented, pleasant, in no apparent distress. HEENT: - Conjunctiva pink, no scleral icterus. ENT clear. NECK: -Supple. No jugular venous distention noted. No masses felt,  CARDIOVASCULAR: - Normal S1 and S2    PULMONARY: - No respiratory distress. No wheezes or rales. ABDOMEN: - Soft and non-tender,no masses  ororganomegaly. EXTREMITIES: - No cyanosis, clubbing, or significant edema. SKIN: Skin is warm and dry. NEUROLOGICAL: - Cranial nerves II through XII are grossly intact. No tenderness of the thoracic spine  No tenderness of the scapula  ROM of the right arm is good. IMPRESSION:    Encounter Diagnoses   Name Primary?     Posterior chest pain Yes    Comment: right side in the scapular area    Essential hypertension     Mixed hyperlipidemia     Multiple lung nodules on CT     Osteoarthritis, unspecified osteoarthritis type, unspecified site     Type 2 diabetes mellitus without complication, without long-term current use of insulin (HCC)     Anxiety     Ascending aortic aneurysm (HCC)     Chronic midline low back pain without sciatica        ASSESSMENT/PLAN:    Use over-the-counter medications  Use lidocaine patch or Aspercreme  If symptoms get worse call  If not resolved may need to see orthopedic surgeon      Orders Placed This Encounter   Procedures    XR CHEST (2 VW)    XR THORACIC SPINE (3 VIEWS)     Return to office in 2 months. Mediations reviewed with the patient. Continue current medications. Appropriate prescriptions are addressed. After visit summeryprovided. Follow up as directed sooner if needed. Questions answered and patient verbalizes understanding. Call for any problems, questions, or concerns. Allergies   Allergen Reactions    Etodolac Other (See Comments)     Drop in bp     Efudex [Fluorouracil]      Current Outpatient Medications   Medication Sig Dispense Refill    atorvastatin (LIPITOR) 40 MG tablet TAKE ONE TABLET BY MOUTH DAILY 90 tablet 1    BLOOD GLUCOSE MONITOR       blood glucose monitor kit and supplies 1 kit by Other route daily Dispense sufficient amount for indicated testing frequency plus additional to accommodate PRN testing needs. Dispense all needed supplies to include: monitor, strips, lancing device, lancets, control solutions, alcohol swabs.  1 kit 1    amLODIPine (NORVASC) 10 MG tablet TAKE ONE TABLET BY MOUTH DAILY 90 tablet 1    benazepril (LOTENSIN) 20 MG tablet TAKE ONE TABLET BY MOUTH DAILY 90 tablet 1    carvedilol (COREG) 12.5 MG tablet TAKE ONE TABLET BY MOUTH TWICE DAILY with meals 180 tablet 1    JANUMET  MG per tablet TAKE ONE TABLET BY MOUTH TWICE DAILY with meals 180 tablet 1    ONE TOUCH LANCETS MISC Testing once daily, DX=E11.9 100 each 1    blood glucose monitor strips Please dispense One Touch Test strips, testing once daily, DX=E11.9 100 strip 1    Alcohol Swabs (ALCOHOL PREP) 70 % PADS Using once daily and prn, DX=E11.9 100 each 5  aspirin 81 MG tablet Take 81 mg by mouth daily. No current facility-administered medications for this visit. Past Medical History:   Diagnosis Date    Adhesive capsulitis of shoulder 2011    Anxiety     h/o anxiety workman comp related. sees a psychologist    Ascending aortic aneurysm ([facility] 2016: CT chest :Mild aneurysmal dilation of the ascending aorta measuring up to 4.1 cm. Recheck in one year    Backache, unspecified 2013    Sees dr. Beni Frias    Chronic back pain     Colonoscopy refused     Depression     Diabetes mellitus ([facility] [address]     ED (erectile dysfunction) 2014    H/O CHF     resolved  EF 25-30 % improved 60% in 3/05    H/O echocardiogram     EF 60 %    Hyperlipidemia     Hypertension     Legionella pneumonia ([facility] 10/27/2016    10/16. Resolved. Seen Tony Frazier.  Limitation due to disability     due to anxiety and agrophobia    Liver dysfunction     blood w/u neg. US fatty liver    Liver dysfunction 10/27/2016    Liver test were abnormal with legionelle pneumonia Now back to normal.    Multiple lung nodules on CT 2016    Small 2-4 mm nodules on CT in 10/16. F/u in one year. Seen Dr Tony Frazier.     Osteoarthritis     spine, hip and foot per pt. sees Dr. Richie Rodriguez    Perirectal abscess     resovled     Past Surgical History:   Procedure Laterality Date    APPENDECTOMY      CHOLECYSTECTOMY, LAPAROSCOPIC      GALLBLADDER SURGERY      PERICARDIUM SURGERY      TONSILLECTOMY      WRIST FRACTURE SURGERY       Social History     Tobacco Use    Smoking status: Former Smoker     Packs/day: 0.10     Years: 2.00     Pack years: 0.20     Types: Cigarettes     Last attempt to quit: 1983     Years since quittin.9    Smokeless tobacco: Never Used   Substance Use Topics    Alcohol use: No     Alcohol/week: 0.0 standard drinks       LAB REVIEW:  CBC:   Lab Results   Component Value Date    WBC 6.4 2020    HGB 14.8

## 2020-12-01 ENCOUNTER — HOSPITAL ENCOUNTER (OUTPATIENT)
Age: 60
Discharge: HOME | End: 2020-12-01
Payer: MEDICARE

## 2020-12-01 ENCOUNTER — HOSPITAL ENCOUNTER (OUTPATIENT)
Dept: GENERAL RADIOLOGY | Age: 60
Discharge: HOME | End: 2020-12-01
Payer: MEDICARE

## 2020-12-01 PROCEDURE — 72072 X-RAY EXAM THORAC SPINE 3VWS: CPT

## 2020-12-01 PROCEDURE — 71046 X-RAY EXAM CHEST 2 VIEWS: CPT

## 2020-12-07 ENCOUNTER — VIRTUAL VISIT (OUTPATIENT)
Dept: INTERNAL MEDICINE CLINIC | Age: 60
End: 2020-12-07
Payer: MEDICARE

## 2020-12-07 PROCEDURE — 99213 OFFICE O/P EST LOW 20 MIN: CPT | Performed by: INTERNAL MEDICINE

## 2020-12-07 PROCEDURE — 81002 URINALYSIS NONAUTO W/O SCOPE: CPT | Performed by: INTERNAL MEDICINE

## 2020-12-07 NOTE — PROGRESS NOTES
2020    TELEHEALTH EVALUATION -- Audio/Visual (During [facility]-66 public health emergency)    HPI:    Snehal Quinteros (:  1960) has requested an audio/video evaluation for the following concern(s):    Chief Complaint   Patient presents with    Pain     on right side when taking deep breath     Patient called to discuss about the pain in the back on the right side. Location of the pain in the last 2 ribs. Tender on palpation. No urinary symptoms. No blood in the urine no fever or chills  X-ray of the thoracic spine and also of chest x-ray showed some mild arthritis otherwise negative. Patient denies any injury or fall  Pain gets worse with certain movements and is sharp at that time last only few seconds. At the time the pain level is 10/10 but right now it is only 1/10. Review of Systems    Prior to Visit Medications    Medication Sig Taking? Authorizing Provider   amLODIPine (NORVASC) 10 MG tablet TAKE ONE TABLET BY MOUTH DAILY  Alpine Dose, MD   benazepril (LOTENSIN) 20 MG tablet TAKE ONE TABLET BY MOUTH DAILY  Venessa Dose, MD   carvedilol (COREG) 12.5 MG tablet TAKE ONE TABLET BY MOUTH TWICE DAILY with meals  Alpine Dose, MD   JANUMET  MG per tablet TAKE ONE TABLET BY MOUTH TWICE DAILY with meals  Alpine Dose, MD   atorvastatin (LIPITOR) 40 MG tablet TAKE ONE TABLET BY MOUTH DAILY  Alpine Dose, MD   BLOOD GLUCOSE MONITOR   Historical Provider, MD   blood glucose monitor kit and supplies 1 kit by Other route daily Dispense sufficient amount for indicated testing frequency plus additional to accommodate PRN testing needs. Dispense all needed supplies to include: monitor, strips, lancing device, lancets, control solutions, alcohol swabs.   Venessa Rucker MD   ONE TOUCH LANCETS MISC Testing once daily, DX=E11.9  Venessa Rucker MD   blood glucose monitor strips Please dispense One Touch Test strips, testing once daily, DX=E11.9  Venessa Rucker MD   Alcohol Swabs (ALCOHOL PREP) 70 % PADS Using once daily and prn, DX=E11.9  Eh Lopez MD   aspirin 81 MG tablet Take 81 mg by mouth daily. Historical Provider, MD       Social History     Tobacco Use    Smoking status: Former Smoker     Packs/day: 0.10     Years: 2.00     Pack years: 0.20     Types: Cigarettes     Last attempt to quit: 1983     Years since quittin.9    Smokeless tobacco: Never Used   Substance Use Topics    Alcohol use: No     Alcohol/week: 0.0 standard drinks    Drug use: No        Allergies   Allergen Reactions    Etodolac Other (See Comments)     Drop in bp     Efudex [Fluorouracil]    ,   Past Medical History:   Diagnosis Date    Adhesive capsulitis of shoulder 2011    Anxiety     h/o anxiety workman comp related. sees a psychologist    Ascending aortic aneurysm ([facility] [address] 2016: CT chest :Mild aneurysmal dilation of the ascending aorta measuring up to 4.1 cm. Recheck in one year    Backache, unspecified 2013    Sees dr. Beni Frias    Chronic back pain     Colonoscopy refused     Depression     Diabetes mellitus ([facility] [address]     ED (erectile dysfunction) 2014    H/O CHF     resolved  EF 25-30 % improved 60% in 3/05    H/O echocardiogram     EF 60 %    Hyperlipidemia     Hypertension     Legionella pneumonia ([facility] [address] 10/27/2016    10/16. Resolved. Seen Tony Frazier.  Limitation due to disability     due to anxiety and agrophobia    Liver dysfunction     blood w/u neg. US fatty liver    Liver dysfunction 10/27/2016    Liver test were abnormal with legionelle pneumonia Now back to normal.    Multiple lung nodules on CT 2016    Small 2-4 mm nodules on CT in 10/16. F/u in one year. Seen Dr Tony Frazier.     Osteoarthritis     spine, hip and foot per pt. sees Dr. Richie Rodriguez    Perirectal abscess     resovled   ,   Past Surgical History:   Procedure Laterality Date    APPENDECTOMY      CHOLECYSTECTOMY, LAPAROSCOPIC      GALLBLADDER SURGERY      PERICARDIUM SURGERY      TONSILLECTOMY      WRIST FRACTURE SURGERY     ,   Social History     Tobacco Use    Smoking status: Former Smoker     Packs/day: 0.10     Years: 2.00     Pack years: 0.20     Types: Cigarettes     Last attempt to quit: 1983     Years since quittin.9    Smokeless tobacco: Never Used   Substance Use Topics    Alcohol use: No     Alcohol/week: 0.0 standard drinks    Drug use: No       PHYSICAL EXAMINATION:  [ INSTRUCTIONS:  \"[x]\" Indicates a positive item  \"[]\" Indicates a negative item  -- DELETE ALL ITEMS NOT EXAMINED]  Vital Signs: (As obtained by patient/caregiver or practitioner observation)    Blood pressure-  Heart rate-    Respiratory rate-    Temperature-  Pulse oximetry-     Constitutional: [x] Appears well-developed and well-nourished [x] No apparent distress      [] Abnormal-   Mental status  [x] Alert and awake  [x] Oriented to person/place/time [x]Able to follow commands      Eyes:  EOM    [x]  Normal  [] Abnormal-  Sclera  [x]  Normal  [] Abnormal -         Discharge [x]  None visible  [] Abnormal -    HENT:   [x] Normocephalic, atraumatic. [] Abnormal   [x] Mouth/Throat: Mucous membranes are moist.     External Ears [x] Normal  [] Abnormal-     Neck: [x] No visualized mass     Pulmonary/Chest: [x] Respiratory effort normal.  [x] No visualized signs of difficulty breathing or respiratory distress        [] Abnormal-      Musculoskeletal:   [x] Normal gait with no signs of ataxia         [x] Normal range of motion of neck        [] Abnormal-       Neurological:        [x] No Facial Asymmetry (Cranial nerve 7 motor function) (limited exam to video visit)          [x] No gaze palsy        [] Abnormal-         Skin:        [x] No significant exanthematous lesions or discoloration noted on facial skin         [] Abnormal-            Psychiatric:       [x] Normal Affect [] No Hallucinations        [] Abnormal-     Other pertinent observable physical exam findings-     ASSESSMENT/PLAN:  1. Posterior chest pain      . Patient is having posterior chest pain near the right CVA  . Patient will stop by to give a urine sample. Will check UA for blood  . Advised patient to use lidocaine patch and take Tylenol as needed  . If the pain is not improved will need further testing. Patient will let me know in couple of weeks with    If the pain gets worse return to office if the pain resolved and see in January    Has follow-up appointment on 1/21/2021  No follow-ups on file. Felicity Love is a 63 y.o. male being evaluated by a Virtual Visit (video visit) encounter to address concerns as mentioned above. A caregiver was present when appropriate. Due to this being a TeleHealth encounter (During [facility] public health emergency), evaluation of the following organ systems was limited: Vitals/Constitutional/EENT/Resp/CV/GI//MS/Neuro/Skin/Heme-Lymph-Imm. Pursuant to the emergency declaration under the [address] [place] [address] authority and the [email or web address] and [employer] Act, this Virtual Visit was conducted with patient's (and/or legal guardian's) consent, to reduce the patient's risk of exposure to COVID-19 and provide necessary medical care. The patient (and/or legal guardian) has also been advised to contact this office for worsening conditions or problems, and seek emergency medical treatment and/or call 911 if deemed necessary. Patient identification was verified at the start of the visit: Yes    Total time spent on this encounter: Not billed by time    Services were provided through a video synchronous discussion virtually to substitute for in-person clinic visit. Patient and provider were located at their individual homes. --Octavio Edouard MD on 12/7/2020 at 1:41 PM    An electronic signature was used to authenticate this note.

## 2020-12-08 ENCOUNTER — NURSE ONLY (OUTPATIENT)
Dept: INTERNAL MEDICINE CLINIC | Age: 60
End: 2020-12-08

## 2020-12-08 LAB
BILIRUBIN, POC: NEGATIVE
BLOOD URINE, POC: NEGATIVE
CLARITY, POC: CLEAR
COLOR, POC: YELLOW
GLUCOSE URINE, POC: NEGATIVE
KETONES, POC: NEGATIVE
LEUKOCYTE EST, POC: NEGATIVE
NITRITE, POC: NEGATIVE
PH, POC: 5.5
PROTEIN, POC: NORMAL
SPECIFIC GRAVITY, POC: 1.02
UROBILINOGEN, POC: 1

## 2021-01-21 ENCOUNTER — OFFICE VISIT (OUTPATIENT)
Dept: INTERNAL MEDICINE CLINIC | Age: 61
End: 2021-01-21
Payer: MEDICARE

## 2021-01-21 VITALS
SYSTOLIC BLOOD PRESSURE: 120 MMHG | DIASTOLIC BLOOD PRESSURE: 60 MMHG | HEART RATE: 72 BPM | BODY MASS INDEX: 26.85 KG/M2 | OXYGEN SATURATION: 98 % | TEMPERATURE: 97.9 F | WEIGHT: 156.4 LBS | RESPIRATION RATE: 16 BRPM

## 2021-01-21 DIAGNOSIS — E78.2 MIXED HYPERLIPIDEMIA: ICD-10-CM

## 2021-01-21 DIAGNOSIS — M54.50 CHRONIC MIDLINE LOW BACK PAIN WITHOUT SCIATICA: ICD-10-CM

## 2021-01-21 DIAGNOSIS — F41.9 ANXIETY: ICD-10-CM

## 2021-01-21 DIAGNOSIS — E11.9 TYPE 2 DIABETES MELLITUS WITHOUT COMPLICATION, WITHOUT LONG-TERM CURRENT USE OF INSULIN (HCC): ICD-10-CM

## 2021-01-21 DIAGNOSIS — I10 ESSENTIAL HYPERTENSION: Primary | ICD-10-CM

## 2021-01-21 DIAGNOSIS — G89.29 CHRONIC MIDLINE LOW BACK PAIN WITHOUT SCIATICA: ICD-10-CM

## 2021-01-21 DIAGNOSIS — M19.90 OSTEOARTHRITIS, UNSPECIFIED OSTEOARTHRITIS TYPE, UNSPECIFIED SITE: ICD-10-CM

## 2021-01-21 DIAGNOSIS — I71.21 ASCENDING AORTIC ANEURYSM: ICD-10-CM

## 2021-01-21 PROCEDURE — 99214 OFFICE O/P EST MOD 30 MIN: CPT | Performed by: INTERNAL MEDICINE

## 2021-01-21 ASSESSMENT — ENCOUNTER SYMPTOMS
COUGH: 0
RESPIRATORY NEGATIVE: 1
SHORTNESS OF BREATH: 0
EYES NEGATIVE: 1
ALLERGIC/IMMUNOLOGIC NEGATIVE: 1
GASTROINTESTINAL NEGATIVE: 1
WHEEZING: 0

## 2021-01-21 ASSESSMENT — PATIENT HEALTH QUESTIONNAIRE - PHQ9
SUM OF ALL RESPONSES TO PHQ QUESTIONS 1-9: 1
2. FEELING DOWN, DEPRESSED OR HOPELESS: 1
SUM OF ALL RESPONSES TO PHQ9 QUESTIONS 1 & 2: 1
1. LITTLE INTEREST OR PLEASURE IN DOING THINGS: 0
SUM OF ALL RESPONSES TO PHQ QUESTIONS 1-9: 1

## 2021-01-21 NOTE — PROGRESS NOTES
Anshu Bower  Patient's  is 1960  Seen in office on 2021      SUBJECTIVE:  [place] 63 y. o.year old male presents today   Chief Complaint   Patient presents with    Hypertension    Diabetes    Flank Pain     still having the pain on right side     Patient is here for 3-month follow-up of diabetes, hypertension and hyperlipidemia. Patient has pain in the right posterior chest around right scapula for the last 3 months. Patient denies any pain. He had x-ray of the thoracic spine, x-ray of the chest and earlier CT scan of the chest showed no abnormality in the ribs or lung. He has mild degenerative arthritis and ascending aortic aneurysm 4 cm  Patient has diabetes mellitus and his blood sugars are running well. No hypoglycemia per patient. No dizziness no syncope or near syncope. No dizziness. No tingling or numbness. He also has hypertension and is taking medications. Denies any chest pain. No shortness of breath. No headaches. Patient has hyperlipidemia and is tolerating medication atorvastatin. Taking medications regularly. No side effects noted. Review of Systems   Constitutional: Negative. HENT: Negative. Eyes: Negative. Respiratory: Negative. Negative for cough, shortness of breath and wheezing. Cardiovascular: Negative. Negative for chest pain, palpitations and leg swelling. Gastrointestinal: Negative. Endocrine: Negative. Genitourinary: Negative. Musculoskeletal: Negative. Skin: Negative. Allergic/Immunologic: Negative. Neurological: Negative. Hematological: Negative. Psychiatric/Behavioral: Negative. OBJECTIVE: /60   Pulse 72   Temp 97.9 °F (36.6 °C) (Oral)   Resp 16   Wt 156 lb 6.4 oz (70.9 kg)   SpO2 98%   BMI 26.85 kg/m²     Wt Readings from Last 3 Encounters:   21 156 lb 6.4 oz (70.9 kg)   20 160 lb 9.6 oz (72.8 kg)   10/23/20 156 lb (70.8 kg)       Patient was seen taking COVID-19 precautions. Face mask, gloves and eyes protectors were used. Patient also wore facemask. GENERAL: - Alert, oriented, pleasant, in no apparent distress. HEENT: - Conjunctiva pink, no scleral icterus. ENT clear. NECK: -Supple. No jugular venous distention noted. No masses felt,  CARDIOVASCULAR: - Normal S1 and S2    PULMONARY: - No respiratory distress. No wheezes or rales. ABDOMEN: - Soft and non-tender,no masses  ororganomegaly. EXTREMITIES: - No cyanosis, clubbing, or significant edema. SKIN: Skin is warm and dry. NEUROLOGICAL: - Cranial nerves II through XII are grossly intact. IMPRESSION:    Encounter Diagnoses   Name Primary?  Essential hypertension Yes    Mixed hyperlipidemia     Type 2 diabetes mellitus without complication, without long-term current use of insulin (HCC)     Osteoarthritis, unspecified osteoarthritis type, unspecified site     Chronic midline low back pain without sciatica     Ascending aortic aneurysm (HCC)     Anxiety        ASSESSMENT/PLAN:  1. Hypertension in control continue amlodipine, Benzapril and Coreg. Follow low-salt diet  2. Hyperlipidemia. Last lipid profile LDL still slightly elevated. Continue atorvastatin 40 mg daily  3. Diabetes mellitus. Hemoglobin A1c 7.0. Continue Janumet  mg twice a day. Blood sugars are staying good at home. Advised patient to see eye doctors and take care of feet  4. Osteoarthritis but is stable  5. Posterior chest pain for the last 3 months. Patient states it is tolerable with Advil or Aleve. Refused any referral.  Will inform us if gets worse  6. Ascending aortic aneurysm 4.0 monitor  7. Anxiety is controlled. Not on any medication    Orders Placed This Encounter   Procedures    Comprehensive Metabolic Panel    Lipid, Fasting    Hemoglobin A1C     Advised COVID-19 precautions    Mediations reviewed with the patient. Continue current medications. Appropriate prescriptions are addressed.  After visit summeryprovided. Follow up as directed sooner if needed. Questions answered and patient verbalizes understanding. Call for any problems, questions, or concerns. Allergies   Allergen Reactions    Etodolac Other (See Comments)     Drop in bp     Efudex [Fluorouracil]      Current Outpatient Medications   Medication Sig Dispense Refill    amLODIPine (NORVASC) 10 MG tablet TAKE ONE TABLET BY MOUTH DAILY 90 tablet 1    benazepril (LOTENSIN) 20 MG tablet TAKE ONE TABLET BY MOUTH DAILY 90 tablet 1    carvedilol (COREG) 12.5 MG tablet TAKE ONE TABLET BY MOUTH TWICE DAILY with meals 180 tablet 1    JANUMET  MG per tablet TAKE ONE TABLET BY MOUTH TWICE DAILY with meals 180 tablet 1    atorvastatin (LIPITOR) 40 MG tablet TAKE ONE TABLET BY MOUTH DAILY 90 tablet 1    aspirin 81 MG tablet Take 81 mg by mouth daily.  BLOOD GLUCOSE MONITOR       blood glucose monitor kit and supplies 1 kit by Other route daily Dispense sufficient amount for indicated testing frequency plus additional to accommodate PRN testing needs. Dispense all needed supplies to include: monitor, strips, lancing device, lancets, control solutions, alcohol swabs. 1 kit 1    ONE TOUCH LANCETS MISC Testing once daily, DX=E11.9 100 each 1    blood glucose monitor strips Please dispense One Touch Test strips, testing once daily, DX=E11.9 100 strip 1    Alcohol Swabs (ALCOHOL PREP) 70 % PADS Using once daily and prn, DX=E11.9 100 each 5     No current facility-administered medications for this visit. Past Medical History:   Diagnosis Date    Adhesive capsulitis of shoulder 4/23/2011    Anxiety     h/o anxiety workman comp related. sees a psychologist    Ascending aortic aneurysm ([facility] [address] 9/9/2016 8/16: CT chest :Mild aneurysmal dilation of the ascending aorta measuring up to 4.1 cm.  Recheck in one year    Backache, unspecified 7/24/2013    Sees dr. Ryan Kemp    Chronic back pain     Colonoscopy refused     Depression     Diabetes mellitus ([facility] [address]     ED (erectile dysfunction) 2014    H/O CHF 2003    resolved  EF 25-30 % improved 60% in 3/05    H/O echocardiogram     EF 60 %    Hyperlipidemia     Hypertension     Legionella pneumonia ([facility] [address] 10/27/2016    10/16. Resolved. Seen Kary Romero.  Limitation due to disability     due to anxiety and agrophobia    Liver dysfunction     blood w/u neg. US fatty liver    Liver dysfunction 10/27/2016    Liver test were abnormal with legionelle pneumonia Now back to normal.    Multiple lung nodules on CT 2016    Small 2-4 mm nodules on CT in 10/16. F/u in one year. Seen Dr Kary Romero.     Osteoarthritis     spine, hip and foot per pt. sees Dr. Todd Lua    Perirectal abscess     resovled     Past Surgical History:   Procedure Laterality Date    APPENDECTOMY      CHOLECYSTECTOMY, LAPAROSCOPIC      GALLBLADDER SURGERY      PERICARDIUM SURGERY      TONSILLECTOMY      WRIST FRACTURE SURGERY       Social History     Tobacco Use    Smoking status: Former Smoker     Packs/day: 0.10     Years: 2.00     Pack years: 0.20     Types: Cigarettes     Quit date: 1983     Years since quittin.0    Smokeless tobacco: Never Used   Substance Use Topics    Alcohol use: No     Alcohol/week: 0.0 standard drinks       LAB REVIEW:  CBC:   Lab Results   Component Value Date    WBC 6.4 2020    HGB 14.8 2020    HCT 45.0 2020     2020     Lipids:   Lab Results   Component Value Date    CHOL 135 02/15/2017    TRIG 205 (H) 02/15/2017    HDL 29 (L) 2020    LDLDIRECT 101 (H) 2020    TRIGLYCFAST 195 (H) 2020     Renal:   Lab Results   Component Value Date    BUN 19 2020    CREATININE 1.1 2020     2020    K 4.4 2020    ALT 26 2020    AST 24 2020    GLUCOSE 117 2020     PT/INR:   Lab Results   Component Value Date    INR 1.29 10/10/2016     A1C:   Lab Results   Component Value Date LABA1C 7.0 (H) 11/23/2020           Josie Gavin MD, 1/21/2021 , 8:27 AM

## 2021-02-10 DIAGNOSIS — E78.2 MIXED HYPERLIPIDEMIA: ICD-10-CM

## 2021-02-10 RX ORDER — ATORVASTATIN CALCIUM 40 MG/1
TABLET, FILM COATED ORAL
Qty: 90 TABLET | Refills: 1 | Status: SHIPPED | OUTPATIENT
Start: 2021-02-10 | End: 2021-07-15

## 2021-04-20 RX ORDER — BLOOD-GLUCOSE METER
EACH MISCELLANEOUS
Qty: 1 KIT | Refills: 1 | Status: SHIPPED | OUTPATIENT
Start: 2021-04-20

## 2021-04-20 RX ORDER — UBIQUINOL 100 MG
CAPSULE ORAL
Qty: 100 EACH | Refills: 1 | Status: SHIPPED | OUTPATIENT
Start: 2021-04-20

## 2021-04-20 RX ORDER — BLOOD-GLUCOSE METER
EACH MISCELLANEOUS
Qty: 50 STRIP | Refills: 1 | Status: SHIPPED | OUTPATIENT
Start: 2021-04-20

## 2021-04-20 RX ORDER — BENAZEPRIL HYDROCHLORIDE 20 MG/1
TABLET ORAL
Qty: 90 TABLET | Refills: 1 | Status: SHIPPED | OUTPATIENT
Start: 2021-04-20 | End: 2021-08-16 | Stop reason: SDUPTHER

## 2021-04-20 RX ORDER — LANCETS 33 GAUGE
EACH MISCELLANEOUS
Qty: 100 EACH | Refills: 1 | Status: SHIPPED | OUTPATIENT
Start: 2021-04-20

## 2021-05-03 ENCOUNTER — HOSPITAL ENCOUNTER (OUTPATIENT)
Age: 61
Discharge: HOME | End: 2021-05-03
Payer: MEDICARE

## 2021-05-03 LAB
ALBUMIN SERPL-MCNC: 4.2 GM/DL (ref 3.4–5)
ALP BLD-CCNC: 93 IU/L (ref 40–129)
ALT SERPL-CCNC: 26 U/L (ref 10–40)
ANION GAP SERPL CALCULATED.3IONS-SCNC: 6 MMOL/L (ref 4–16)
AST SERPL-CCNC: 19 IU/L (ref 15–37)
BILIRUB SERPL-MCNC: 0.4 MG/DL (ref 0–1)
BUN BLDV-MCNC: 15 MG/DL (ref 6–23)
CALCIUM SERPL-MCNC: 9.3 MG/DL (ref 8.3–10.6)
CHLORIDE BLD-SCNC: 102 MMOL/L (ref 99–110)
CHOLESTEROL, FASTING: 149 MG/DL
CO2: 32 MMOL/L (ref 21–32)
CREAT SERPL-MCNC: 1 MG/DL (ref 0.9–1.3)
ESTIMATED AVERAGE GLUCOSE: 197 MG/DL
GFR AFRICAN AMERICAN: >60 ML/MIN/1.73M2
GFR NON-AFRICAN AMERICAN: >60 ML/MIN/1.73M2
GLUCOSE BLD-MCNC: 170 MG/DL (ref 70–99)
HBA1C MFR BLD: 8.5 % (ref 4.2–6.3)
HDLC SERPL-MCNC: 34 MG/DL
LDL CHOLESTEROL DIRECT: 89 MG/DL
POTASSIUM SERPL-SCNC: 4.3 MMOL/L (ref 3.5–5.1)
SODIUM BLD-SCNC: 140 MMOL/L (ref 135–145)
TOTAL PROTEIN: 6.6 GM/DL (ref 6.4–8.2)
TRIGLYCERIDE, FASTING: 203 MG/DL

## 2021-05-03 PROCEDURE — 80053 COMPREHEN METABOLIC PANEL: CPT

## 2021-05-03 PROCEDURE — 36415 COLL VENOUS BLD VENIPUNCTURE: CPT

## 2021-05-03 PROCEDURE — 83036 HEMOGLOBIN GLYCOSYLATED A1C: CPT

## 2021-05-03 PROCEDURE — 80061 LIPID PANEL: CPT

## 2021-05-07 ENCOUNTER — OFFICE VISIT (OUTPATIENT)
Dept: INTERNAL MEDICINE CLINIC | Age: 61
End: 2021-05-07
Payer: MEDICARE

## 2021-05-07 VITALS
RESPIRATION RATE: 16 BRPM | SYSTOLIC BLOOD PRESSURE: 118 MMHG | BODY MASS INDEX: 28.49 KG/M2 | HEART RATE: 72 BPM | WEIGHT: 166 LBS | DIASTOLIC BLOOD PRESSURE: 72 MMHG | TEMPERATURE: 98.3 F | OXYGEN SATURATION: 98 %

## 2021-05-07 DIAGNOSIS — M19.90 OSTEOARTHRITIS, UNSPECIFIED OSTEOARTHRITIS TYPE, UNSPECIFIED SITE: ICD-10-CM

## 2021-05-07 DIAGNOSIS — I10 ESSENTIAL HYPERTENSION: ICD-10-CM

## 2021-05-07 DIAGNOSIS — E11.9 TYPE 2 DIABETES MELLITUS WITHOUT COMPLICATION, WITHOUT LONG-TERM CURRENT USE OF INSULIN (HCC): ICD-10-CM

## 2021-05-07 DIAGNOSIS — E78.2 MIXED HYPERLIPIDEMIA: ICD-10-CM

## 2021-05-07 PROCEDURE — 99214 OFFICE O/P EST MOD 30 MIN: CPT | Performed by: INTERNAL MEDICINE

## 2021-05-07 PROCEDURE — 3052F HG A1C>EQUAL 8.0%<EQUAL 9.0%: CPT | Performed by: INTERNAL MEDICINE

## 2021-05-07 RX ORDER — SITAGLIPTIN AND METFORMIN HYDROCHLORIDE 1000; 50 MG/1; MG/1
TABLET, FILM COATED ORAL
Qty: 180 TABLET | Refills: 1 | Status: SHIPPED | OUTPATIENT
Start: 2021-05-07 | End: 2021-10-11 | Stop reason: SDUPTHER

## 2021-05-07 RX ORDER — CARVEDILOL 12.5 MG/1
TABLET ORAL
Qty: 180 TABLET | Refills: 1 | Status: SHIPPED | OUTPATIENT
Start: 2021-05-07 | End: 2021-10-11 | Stop reason: SDUPTHER

## 2021-05-07 RX ORDER — AMLODIPINE BESYLATE 10 MG/1
TABLET ORAL
Qty: 90 TABLET | Refills: 1 | Status: SHIPPED | OUTPATIENT
Start: 2021-05-07 | End: 2021-10-11 | Stop reason: SDUPTHER

## 2021-05-07 RX ORDER — GLIPIZIDE 2.5 MG/1
2.5 TABLET, EXTENDED RELEASE ORAL DAILY
Qty: 90 TABLET | Refills: 0 | Status: SHIPPED | OUTPATIENT
Start: 2021-05-07 | End: 2021-08-10

## 2021-05-07 ASSESSMENT — ENCOUNTER SYMPTOMS
COUGH: 0
GASTROINTESTINAL NEGATIVE: 1
ABDOMINAL PAIN: 0
EYES NEGATIVE: 1
SHORTNESS OF BREATH: 0
WHEEZING: 0
ALLERGIC/IMMUNOLOGIC NEGATIVE: 1
BLOOD IN STOOL: 0
ABDOMINAL DISTENTION: 0
RESPIRATORY NEGATIVE: 1

## 2021-05-07 NOTE — ASSESSMENT & PLAN NOTE
Hyperlipidemia in good control  On atorvastatin 40 mg daily  Patient is stable. Continue current treatment. Follow diet. TRi elevated. Follow diet.

## 2021-05-07 NOTE — ASSESSMENT & PLAN NOTE
ARTHRITIC pain in spine, hip and feet per pt.used to  sees Dr. Leon Aw  Has arthritis of different joints. But is bearable. Not taking any med  [facility] [place] Dr Alisha Cox. He told him cannot do much to him.

## 2021-05-07 NOTE — PROGRESS NOTES
-Supple. No jugular venous distention noted. No masses felt,  CARDIOVASCULAR: - Normal S1 and S2    PULMONARY: - No respiratory distress. No wheezes or rales. ABDOMEN: - Soft and non-tender,no masses  ororganomegaly. EXTREMITIES: - No cyanosis, clubbing, or significant edema. SKIN: Skin is warm and dry. NEUROLOGICAL: - Cranial nerves II through XII are grossly intact. IMPRESSION:    Encounter Diagnoses   Name Primary?  Essential hypertension     Mixed hyperlipidemia     Osteoarthritis, unspecified osteoarthritis type, unspecified site     Type 2 diabetes mellitus without complication, without long-term current use of insulin (HCC)        ASSESSMENT/PLAN:    Essential hypertension  Hypertension in control  Patient is on amlodipine, benazepril and carvedilol  Patient is stable. Continue current treatment. Mixed hyperlipidemia  Hyperlipidemia in good control  On atorvastatin 40 mg daily  Patient is stable. Continue current treatment. Follow diet. TRi elevated. Follow diet. Osteoarthritis  ARTHRITIC pain in spine, hip and feet per pt.used to  sees Dr. Nellie Munoz  Has arthritis of different joints. But is bearable. Not taking any med  [facility] Norma Rosas. He told him cannot do much to him. Type 2 diabetes mellitus without complication (HCC)  Pt has DM since ? 2007  Sees Ophthalmologist once a year  change to Janumet 50/1000 mg bid  hga1c increased to     Patient did not want any preventive medicine albuterol expensive. He does not want feet examination as it was also expensive for patient. Return to office in 3 months. Mediations reviewed with the patient. Continue current medications. Appropriate prescriptions are addressed. After visit summeryprovided. Follow up as directed sooner if needed. Questions answered and patient verbalizes understanding. Call for any problems, questions, or concerns.        Allergies   Allergen Reactions    Etodolac Other (See Comments)     Drop in bp     Efudex [Fluorouracil]      Current Outpatient Medications   Medication Sig Dispense Refill    benazepril (LOTENSIN) 20 MG tablet TAKE ONE TABLET BY MOUTH DAILY 90 tablet 1    atorvastatin (LIPITOR) 40 MG tablet TAKE ONE TABLET BY MOUTH DAILY 90 tablet 1    amLODIPine (NORVASC) 10 MG tablet TAKE ONE TABLET BY MOUTH DAILY 90 tablet 1    carvedilol (COREG) 12.5 MG tablet TAKE ONE TABLET BY MOUTH TWICE DAILY with meals 180 tablet 1    JANUMET  MG per tablet TAKE ONE TABLET BY MOUTH TWICE DAILY with meals 180 tablet 1    aspirin 81 MG tablet Take 81 mg by mouth daily.  Easy Touch Lancets 33G/Twist MISC USE TO test blood sugar EVERY DAY AS NEEDED 100 each 1    Blood Glucose Monitoring Suppl (LIFX GLUC MON SYS) w/Device KIT USE TO test blood sugar EVERY DAY AS NEEDED 1 kit 1    Alcohol Swabs (ALCOHOL PREP) 70 % PADS USE TO test blood sugar EVERY DAY AS NEEDED 100 each 1    LIFX GLUCOSE TEST strip USE TO test blood sugar EVERY DAY AS NEEDED 50 strip 1     No current facility-administered medications for this visit. Past Medical History:   Diagnosis Date    Adhesive capsulitis of shoulder 4/23/2011    Anxiety     h/o anxiety workman comp related. sees a psychologist    Ascending aortic aneurysm ([facility] [address] 9/9/2016 8/16: CT chest :Mild aneurysmal dilation of the ascending aorta measuring up to 4.1 cm. Recheck in one year    Backache, unspecified 7/24/2013    [facility]    Chronic back pain     Colonoscopy refused     Depression     Diabetes mellitus ([facility] [address]     ED (erectile dysfunction) 1/25/2014    H/O CHF 2003    resolved  EF 25-30 % improved 60% in 3/05    H/O echocardiogram 2011    EF 60 %    Hyperlipidemia     Hypertension     Legionella pneumonia ([facility] [address] 10/27/2016    10/16. Resolved. Seen [facility].  Limitation due to disability     due to anxiety and agrophobia    Liver dysfunction     blood w/u neg.  US fatty

## 2021-05-07 NOTE — ASSESSMENT & PLAN NOTE
Pt has DM since ? 2007  Sees Ophthalmologist once a year  change to Janumet 50/1000 mg bid  hga1c increased to

## 2021-07-14 DIAGNOSIS — E78.2 MIXED HYPERLIPIDEMIA: ICD-10-CM

## 2021-07-15 RX ORDER — ATORVASTATIN CALCIUM 40 MG/1
TABLET, FILM COATED ORAL
Qty: 90 TABLET | Refills: 1 | Status: SHIPPED | OUTPATIENT
Start: 2021-07-15 | End: 2021-10-11 | Stop reason: SDUPTHER

## 2021-07-15 NOTE — TELEPHONE ENCOUNTER
Medication:   Requested Prescriptions     Pending Prescriptions Disp Refills    atorvastatin (LIPITOR) 40 MG tablet [Pharmacy Med Name: atorvastatin 40 mg tablet] 90 tablet 1     Sig: TAKE ONE TABLET BY MOUTH DAILY        Last Filled:  02/10/2021 #90 tablets 1 refill    Patient Phone Number: [phone] (home)     Last appt: 5/7/2021   Next appt: 8/11/2021    Last OARRS: No flowsheet data found.

## 2021-08-10 RX ORDER — GLIPIZIDE 2.5 MG/1
TABLET, EXTENDED RELEASE ORAL
Qty: 90 TABLET | Refills: 0 | Status: SHIPPED | OUTPATIENT
Start: 2021-08-10 | End: 2021-10-11 | Stop reason: SDUPTHER

## 2021-08-16 ENCOUNTER — OFFICE VISIT (OUTPATIENT)
Dept: INTERNAL MEDICINE CLINIC | Age: 61
End: 2021-08-16
Payer: MEDICARE

## 2021-08-16 VITALS
HEIGHT: 64 IN | SYSTOLIC BLOOD PRESSURE: 114 MMHG | DIASTOLIC BLOOD PRESSURE: 62 MMHG | OXYGEN SATURATION: 97 % | HEART RATE: 72 BPM | RESPIRATION RATE: 16 BRPM | TEMPERATURE: 99.3 F | WEIGHT: 166.2 LBS | BODY MASS INDEX: 28.38 KG/M2

## 2021-08-16 DIAGNOSIS — R91.8 MULTIPLE LUNG NODULES ON CT: ICD-10-CM

## 2021-08-16 DIAGNOSIS — M19.90 OSTEOARTHRITIS, UNSPECIFIED OSTEOARTHRITIS TYPE, UNSPECIFIED SITE: ICD-10-CM

## 2021-08-16 DIAGNOSIS — I10 ESSENTIAL HYPERTENSION: ICD-10-CM

## 2021-08-16 DIAGNOSIS — E11.9 TYPE 2 DIABETES MELLITUS WITHOUT COMPLICATION, WITHOUT LONG-TERM CURRENT USE OF INSULIN (HCC): Primary | ICD-10-CM

## 2021-08-16 DIAGNOSIS — G89.29 CHRONIC MIDLINE LOW BACK PAIN WITHOUT SCIATICA: ICD-10-CM

## 2021-08-16 DIAGNOSIS — E78.2 MIXED HYPERLIPIDEMIA: ICD-10-CM

## 2021-08-16 DIAGNOSIS — I71.21 ASCENDING AORTIC ANEURYSM: ICD-10-CM

## 2021-08-16 DIAGNOSIS — F41.9 ANXIETY: ICD-10-CM

## 2021-08-16 DIAGNOSIS — M54.50 CHRONIC MIDLINE LOW BACK PAIN WITHOUT SCIATICA: ICD-10-CM

## 2021-08-16 LAB — HBA1C MFR BLD: 7.5 %

## 2021-08-16 PROCEDURE — 83036 HEMOGLOBIN GLYCOSYLATED A1C: CPT | Performed by: INTERNAL MEDICINE

## 2021-08-16 PROCEDURE — 3051F HG A1C>EQUAL 7.0%<8.0%: CPT | Performed by: INTERNAL MEDICINE

## 2021-08-16 PROCEDURE — 99213 OFFICE O/P EST LOW 20 MIN: CPT | Performed by: INTERNAL MEDICINE

## 2021-08-16 RX ORDER — BENAZEPRIL HYDROCHLORIDE 20 MG/1
20 TABLET ORAL DAILY
Qty: 90 TABLET | Refills: 1 | Status: SHIPPED | OUTPATIENT
Start: 2021-08-16 | End: 2021-10-11 | Stop reason: SDUPTHER

## 2021-08-16 ASSESSMENT — PATIENT HEALTH QUESTIONNAIRE - PHQ9
1. LITTLE INTEREST OR PLEASURE IN DOING THINGS: 0
SUM OF ALL RESPONSES TO PHQ QUESTIONS 1-9: 0
2. FEELING DOWN, DEPRESSED OR HOPELESS: 0
SUM OF ALL RESPONSES TO PHQ QUESTIONS 1-9: 0
SUM OF ALL RESPONSES TO PHQ QUESTIONS 1-9: 0
SUM OF ALL RESPONSES TO PHQ9 QUESTIONS 1 & 2: 0

## 2021-08-16 NOTE — ASSESSMENT & PLAN NOTE
Small 2-4 mm nodules on CT in 10/16. F/u in one year. Seen Dr Juliana Gonsalez. F/u 9/2017 : stable nodules. Benign based on stability criteria.  No f/u needed  CT chest 7/2020 shows a stable pulmonary nodules no need for further work-up

## 2021-08-16 NOTE — ASSESSMENT & PLAN NOTE
ARTHRITIC pain in spine, hip and feet per pt.used to  sees Dr. Unruly Arvizu  Has arthritis of different joints. But is bearable. Not taking any med  [facility] [place] Dr Carlos Mancuso. He told him cannot do much to him.

## 2021-08-16 NOTE — PROGRESS NOTES
Lazaro Hakwins  Patient's  is 1960  Seen in office on 2021      SUBJECTIVE:  Lorin scott 63 y. o.year old male presents today   Chief Complaint   Patient presents with    Hypertension     Patient is here for follow-up of diabetes, hypertension, hyperlipidemia  Patient still has anxiety and sees psychologist periodically but not as frequent as before. Patient has DM. No hypoglycemia. No numbness or weakness. No dizziness. Blood sugars are good at home. Patient has hypertension. Taking medications No headaches, no chest pain, no palpitations and no dizziness. Patient has hyperlipidemia. Taking medications. No abdominal pain, no nausea or vomiting. No myalgias. Patient denies any falls or injuries. Taking medications regularly. No side effects noted. Review of Systems  Review of system normal except as in HPI  OBJECTIVE: /62   Pulse 72   Temp 99.3 °F (37.4 °C) (Oral)   Resp 16   Ht 5' 4\" (1.626 m) Comment: with shoes  Wt 166 lb 3.2 oz (75.4 kg)   SpO2 97%   BMI 28.53 kg/m²     Wt Readings from Last 3 Encounters:   21 166 lb 3.2 oz (75.4 kg)   21 166 lb (75.3 kg)   21 156 lb 6.4 oz (70.9 kg)      Patient was seen taking COVID-19 precautions. Face mask, gloves were used. Patient also wore facemask. GENERAL: - Alert, oriented, pleasant, in no apparent distress. HEENT: - Conjunctiva pink, no scleral icterus. ENT clear. NECK: -Supple. No jugular venous distention noted. No masses felt,  CARDIOVASCULAR: - Normal S1 and S2    PULMONARY: - No respiratory distress. No wheezes or rales. ABDOMEN: - Soft and non-tender,no masses  ororganomegaly. EXTREMITIES: - No cyanosis, clubbing, or significant edema. SKIN: Skin is warm and dry. NEUROLOGICAL: - Cranial nerves II through XII are grossly intact. IMPRESSION:    Encounter Diagnoses   Name Primary?     Type 2 diabetes mellitus without complication, without long-term current use of insulin ([address]     Essential hypertension     Anxiety     Mixed hyperlipidemia     Osteoarthritis, unspecified osteoarthritis type, unspecified site     Ascending aortic aneurysm (HCC)     Multiple lung nodules on CT     Chronic midline low back pain without sciatica        ASSESSMENT/PLAN:    Type 2 diabetes mellitus without complication ([facility] [address]    Pt has DM since ? 2007  Sees Ophthalmologist once a year  OnJanumet 50/1000 mg bid  hga1c is 7.5  Strict diet. Essential hypertension      Patient is on amlodipine, benazepril and carvedilol  Continue current treatment. BP in control. Anxiety    Sees psychologist [facility] 1-2 times in 3 months. He takes a drive with pt. Pt states Dr Migel Tariq has retired and he found another psychologist and is going to see her. Pt sees Mo Jordan now q 3 months prn. Has not seen her for some time. Mixed hyperlipidemia    Hyperlipidemia in good control  On atorvastatin 40 mg daily  Patient is stable. Continue current treatment. Follow diet. Osteoarthritis    ARTHRITIC pain in spine, hip and feet per pt.used to  sees Dr. Paul Romoyou  Has arthritis of different joints. But is bearable. Not taking any med  [facility] [address]  Dr Bartolo Webb. He told him cannot do much to him. Ascending aortic aneurysm ([facility] [address]    8/16: CT chest :Mild aneurysmal dilation of the ascending aorta measuring up to 4.1 cm.  9/16: stable. Ectasia 4.1 cm  9/17: stable. Ectasia 4.1 cm  10/2018 : 4.1 cm   Recheck in one year. Refused : financial reason. 7/2020. Ascending aortic ectasia at 4 cm : stable     Multiple lung nodules on CT    Small 2-4 mm nodules on CT in 10/16. F/u in one year. Seen Dr Deven Justin. F/u 9/2017 : stable nodules. Benign based on stability criteria. No f/u needed  CT chest 7/2020 shows a stable pulmonary nodules no need for further work-up    Chronic midline low back pain without sciatica   in control.          Health Maintenance reviewed -   Refused pneumovacc , covid 19  Colon screen : refused  Diabetic foot exam : refused because of financial reason. .    Time spent 25 minutes   Mediations reviewed with the patient. Continue current medications. Appropriate prescriptions are addressed. After visit summeryprovided. Follow up as directed sooner if needed. Questions answered and patient verbalizes understanding. Call for any problems, questions, or concerns. Allergies   Allergen Reactions    Etodolac Other (See Comments)     Drop in bp     Efudex [Fluorouracil]      Current Outpatient Medications   Medication Sig Dispense Refill    glipiZIDE (GLUCOTROL XL) 2.5 MG extended release tablet TAKE ONE TABLET BY MOUTH DAILY 90 tablet 0    atorvastatin (LIPITOR) 40 MG tablet TAKE ONE TABLET BY MOUTH DAILY 90 tablet 1    amLODIPine (NORVASC) 10 MG tablet TAKE ONE TABLET BY MOUTH DAILY 90 tablet 1    carvedilol (COREG) 12.5 MG tablet TAKE ONE TABLET BY MOUTH TWICE DAILY with meals 180 tablet 1    JANUMET  MG per tablet TAKE ONE TABLET BY MOUTH TWICE DAILY with meals 180 tablet 1    benazepril (LOTENSIN) 20 MG tablet TAKE ONE TABLET BY MOUTH DAILY 90 tablet 1    aspirin 81 MG tablet Take 81 mg by mouth daily.  Easy Touch Lancets 33G/Twist MISC USE TO test blood sugar EVERY DAY AS NEEDED 100 each 1    Blood Glucose Monitoring Suppl ([employer]onaverde[employer] BTI SystemsPRO GLUC MON SYS) w/Device KIT USE TO test blood sugar EVERY DAY AS NEEDED 1 kit 1    Alcohol Swabs (ALCOHOL PREP) 70 % PADS USE TO test blood sugar EVERY DAY AS NEEDED 100 each 1    Auto[employer]BackPRO GLUCOSE TEST strip USE TO test blood sugar EVERY DAY AS NEEDED 50 strip 1     No current facility-administered medications for this visit. Past Medical History:   Diagnosis Date    Adhesive capsulitis of shoulder 4/23/2011    Anxiety     h/o anxiety workman comp related. sees a psychologist    Ascending aortic aneurysm ([facility] [address] [address] 9/9/2016 8/16: CT chest :Mild aneurysmal dilation of the ascending aorta measuring up to 4.1 cm.  Recheck in one year    Backache, unspecified 2013    Sees dr. Edilberto Hendrickson    Chronic back pain     Colonoscopy refused     Depression     Diabetes mellitus ([facility] [address]     ED (erectile dysfunction) 2014    H/O CHF 2003    resolved  EF 25-30 % improved 60% in 3/05    H/O echocardiogram     EF 60 %    Hyperlipidemia     Hypertension     Legionella pneumonia ([facility] [address] 10/27/2016    10/16. Resolved. Seen Fabian River.  Limitation due to disability     due to anxiety and agrophobia    Liver dysfunction     blood w/u neg. US fatty liver    Liver dysfunction 10/27/2016    Liver test were abnormal with legionelle pneumonia Now back to normal.    Multiple lung nodules on CT 2016    Small 2-4 mm nodules on CT in 10/16. F/u in one year. Seen Dr Fabian River.     Osteoarthritis     spine, hip and foot per pt. sees Dr. Millard Boas    Perirectal abscess     resovled     Past Surgical History:   Procedure Laterality Date    APPENDECTOMY      CHOLECYSTECTOMY, LAPAROSCOPIC      GALLBLADDER SURGERY      PERICARDIUM SURGERY      TONSILLECTOMY      WRIST FRACTURE SURGERY       Social History     Tobacco Use    Smoking status: Former Smoker     Packs/day: 0.10     Years: 2.00     Pack years: 0.20     Types: Cigarettes     Quit date: 1983     Years since quittin.6    Smokeless tobacco: Never Used   Substance Use Topics    Alcohol use: No     Alcohol/week: 0.0 standard drinks       LAB REVIEW:  CBC:   Lab Results   Component Value Date    WBC 6.4 2020    HGB 14.8 2020    HCT 45.0 2020     2020     Lipids:   Lab Results   Component Value Date    HDL 34 (L) 2021    LDLDIRECT 89 2021    TRIGLYCFAST 203 (H) 2021    CHOLFAST 149 2021     Renal:   Lab Results   Component Value Date    BUN 15 2021    CREATININE 1.0 2021     2021    K 4.3 2021    ALT 26 2021    AST 19 2021    GLUCOSE 170 2021    GLUF 133 10/23/2020     PT/INR:   Lab Results   Component Value Date    INR 1.29 10/10/2016     A1C:   Lab Results   Component Value Date    LABA1C 8.5 (H) 05/03/2021           Evert Mcdonald MD, 8/16/2021 , 9:37 AM

## 2021-08-16 NOTE — ASSESSMENT & PLAN NOTE
Sees psychologist [facility] 1-2 times in 3 months. He takes a drive with pt. Pt states Dr Khoi Barraza has retired and he found another psychologist and is going to see her. Pt sees Yanira Garcia now q 3 months prn. Has not seen her for some time.

## 2021-08-16 NOTE — ASSESSMENT & PLAN NOTE
8/16: CT chest :Mild aneurysmal dilation of the ascending aorta measuring up to 4.1 cm.  9/16: stable. Ectasia 4.1 cm  9/17: stable. Ectasia 4.1 cm  10/2018 : 4.1 cm   Recheck in one year. Refused : financial reason. 7/2020.   Ascending aortic ectasia at 4 cm : stable

## 2021-08-27 ENCOUNTER — HOSPITAL ENCOUNTER (OUTPATIENT)
Dept: CARDIAC REHAB | Age: 61
Discharge: HOME | End: 2021-08-27
Payer: MEDICARE

## 2021-08-27 LAB
LV EF: 58 %
LVEF MODALITY: NORMAL

## 2021-08-27 PROCEDURE — 93306 TTE W/DOPPLER COMPLETE: CPT

## 2021-10-11 DIAGNOSIS — E78.2 MIXED HYPERLIPIDEMIA: ICD-10-CM

## 2021-10-11 RX ORDER — AMLODIPINE BESYLATE 10 MG/1
TABLET ORAL
Qty: 90 TABLET | Refills: 1 | Status: SHIPPED | OUTPATIENT
Start: 2021-10-11 | End: 2022-05-16 | Stop reason: SDUPTHER

## 2021-10-11 RX ORDER — BENAZEPRIL HYDROCHLORIDE 20 MG/1
20 TABLET ORAL DAILY
Qty: 90 TABLET | Refills: 1 | Status: SHIPPED | OUTPATIENT
Start: 2021-10-11 | End: 2022-05-16 | Stop reason: SDUPTHER

## 2021-10-11 RX ORDER — ATORVASTATIN CALCIUM 40 MG/1
40 TABLET, FILM COATED ORAL DAILY
Qty: 90 TABLET | Refills: 1 | Status: SHIPPED | OUTPATIENT
Start: 2021-10-11 | End: 2022-05-16 | Stop reason: SDUPTHER

## 2021-10-11 RX ORDER — SITAGLIPTIN AND METFORMIN HYDROCHLORIDE 1000; 50 MG/1; MG/1
TABLET, FILM COATED ORAL
Qty: 180 TABLET | Refills: 1 | Status: SHIPPED | OUTPATIENT
Start: 2021-10-11 | End: 2021-11-03

## 2021-10-11 RX ORDER — GLIPIZIDE 2.5 MG/1
2.5 TABLET, EXTENDED RELEASE ORAL DAILY
Qty: 90 TABLET | Refills: 1 | Status: SHIPPED | OUTPATIENT
Start: 2021-10-11 | End: 2021-11-03 | Stop reason: DRUGHIGH

## 2021-10-11 RX ORDER — CARVEDILOL 12.5 MG/1
TABLET ORAL
Qty: 180 TABLET | Refills: 1 | Status: SHIPPED | OUTPATIENT
Start: 2021-10-11 | End: 2022-05-16 | Stop reason: SDUPTHER

## 2021-10-11 NOTE — TELEPHONE ENCOUNTER
----- Message from [employer] Form sent at 10/7/2021  8:49 AM EDT -----  Subject: Refill Request    QUESTIONS  Name of Medication? amLODIPine (NORVASC) 10 MG tablet  Patient-reported dosage and instructions? TAKE ONE TABLET BY MOUTH DAILY  How many days do you have left? 1  Preferred Pharmacy? [facility] #[id number]  Pharmacy phone number (if available)? [phone]  Additional Information for Provider? Patient is out of town and he has to   get his truck fix. He stated he needs a 10 day supply. please advise  ---------------------------------------------------------------------------  --------------,  Name of Medication? carvedilol (COREG) 12.5 MG tablet  Patient-reported dosage and instructions? TAKE ONE TABLET BY MOUTH TWICE   DAILY with meals  How many days do you have left? 1  Preferred Pharmacy? [facility] #[id number]  Pharmacy phone number (if available)? [phone]  Additional Information for Provider? Patient is out of town and he has to   get his truck fix. He stated he needs a 10 day supply. please advise  ---------------------------------------------------------------------------  --------------,  Name of Medication? JANUMET  MG per tablet  Patient-reported dosage and instructions? TAKE ONE TABLET BY MOUTH TWICE   DAILY with meals  How many days do you have left? 1  Preferred Pharmacy? [facility] #[id number]  Pharmacy phone number (if available)? [phone]  Additional Information for Provider? Patient is out of town and he has to   get his truck fix. He stated he needs a 10 day supply. please advise  ---------------------------------------------------------------------------  --------------,  Name of Medication? atorvastatin (LIPITOR) 40 MG tablet  Patient-reported dosage and instructions? TAKE ONE TABLET BY MOUTH DAILY  How many days do you have left? 1  Preferred Pharmacy?  [id number]  Pharmacy phone number (if available)? [phone]  Additional Information for Provider? Patient is out of town and he has to   get his truck fix. He stated he needs a 10 day supply. please advise  ---------------------------------------------------------------------------  --------------,  Name of Medication? glipiZIDE (GLUCOTROL XL) 2.5 MG extended release   tablet  Patient-reported dosage and instructions? TAKE ONE TABLET BY MOUTH DAILY  How many days do you have left? 1  Preferred Pharmacy? [facility] #[id number]  Pharmacy phone number (if available)? [phone]  Additional Information for Provider? Patient is out of town and he has to   get his truck fix. He stated he needs a 10 day supply. please advise  ---------------------------------------------------------------------------  --------------,  Name of Medication? benazepril (LOTENSIN) 20 MG tablet  Patient-reported dosage and instructions? Take 1 tablet by mouth daily  How many days do you have left? 1  Preferred Pharmacy? [facility] #[id number]  Pharmacy phone number (if available)? [phone]  Additional Information for Provider? Patient is out of town and he has to   get his truck fix. He stated he needs a 10 day supply. please advise  ---------------------------------------------------------------------------  --------------  CALL BACK INFO  What is the best way for the office to contact you? OK to leave message on   voicemail  Preferred Call Back Phone Number?  [phone]

## 2021-10-14 ENCOUNTER — TELEPHONE (OUTPATIENT)
Dept: INTERNAL MEDICINE CLINIC | Age: 61
End: 2021-10-14

## 2021-10-19 ENCOUNTER — TELEPHONE (OUTPATIENT)
Dept: INTERNAL MEDICINE CLINIC | Age: 61
End: 2021-10-19

## 2021-10-19 NOTE — TELEPHONE ENCOUNTER
Pt states he is unable to afford Janumet even with good Rx card. Pt states he spoke with  and there is a program to make the medication more affordable, but it seems as if it would be a long process as there is a form to fill out and a lot of follow up with it per patient. Patient states he is possibly switching insurances soon to a cheaper plan, but in the mean time is there anything else that is cheaper, he spoke with insurance and they said something about a care gap and he isn't sure if there is anything else. Please advise.

## 2021-10-29 ENCOUNTER — VIRTUAL VISIT (OUTPATIENT)
Dept: INTERNAL MEDICINE CLINIC | Age: 61
End: 2021-10-29
Payer: MEDICARE

## 2021-10-29 DIAGNOSIS — Z00.00 ROUTINE GENERAL MEDICAL EXAMINATION AT A HEALTH CARE FACILITY: Primary | ICD-10-CM

## 2021-10-29 PROCEDURE — G0439 PPPS, SUBSEQ VISIT: HCPCS | Performed by: INTERNAL MEDICINE

## 2021-10-29 SDOH — ECONOMIC STABILITY: FOOD INSECURITY: WITHIN THE PAST 12 MONTHS, YOU WORRIED THAT YOUR FOOD WOULD RUN OUT BEFORE YOU GOT MONEY TO BUY MORE.: NEVER TRUE

## 2021-10-29 SDOH — ECONOMIC STABILITY: FOOD INSECURITY: WITHIN THE PAST 12 MONTHS, THE FOOD YOU BOUGHT JUST DIDN'T LAST AND YOU DIDN'T HAVE MONEY TO GET MORE.: NEVER TRUE

## 2021-10-29 ASSESSMENT — PATIENT HEALTH QUESTIONNAIRE - PHQ9
1. LITTLE INTEREST OR PLEASURE IN DOING THINGS: 0
2. FEELING DOWN, DEPRESSED OR HOPELESS: 1
SUM OF ALL RESPONSES TO PHQ QUESTIONS 1-9: 1
SUM OF ALL RESPONSES TO PHQ9 QUESTIONS 1 & 2: 1
SUM OF ALL RESPONSES TO PHQ QUESTIONS 1-9: 1
SUM OF ALL RESPONSES TO PHQ QUESTIONS 1-9: 1

## 2021-10-29 ASSESSMENT — LIFESTYLE VARIABLES: HOW OFTEN DO YOU HAVE A DRINK CONTAINING ALCOHOL: 0

## 2021-10-29 ASSESSMENT — SOCIAL DETERMINANTS OF HEALTH (SDOH): HOW HARD IS IT FOR YOU TO PAY FOR THE VERY BASICS LIKE FOOD, HOUSING, MEDICAL CARE, AND HEATING?: SOMEWHAT HARD

## 2021-10-29 NOTE — PROGRESS NOTES
Medicare Annual Wellness Visit  Name: Erin Gunn Date: 10/29/2021   MRN: [id number] Sex: Male   Age: 63 y.o. Ethnicity: Non- / Non    : 1960 Race: White (non-)      Rhea Magallon is here for Medicare AWV    Screenings for behavioral, psychosocial and functional/safety risks, and cognitive dysfunction are all negative except as indicated below. These results, as well as other patient data from the [address] form, are documented in Flowsheets linked to this Encounter. Allergies   Allergen Reactions    Etodolac Other (See Comments)     Drop in bp     Efudex [Fluorouracil]          Prior to Visit Medications    Medication Sig Taking? Authorizing Provider   glipiZIDE (GLUCOTROL XL) 2.5 MG extended release tablet Take 1 tablet by mouth daily Yes Nima Aburto MD   carvedilol (COREG) 12.5 MG tablet TAKE ONE TABLET BY MOUTH TWICE DAILY with meals Yes Nima Aburto MD   atorvastatin (LIPITOR) 40 MG tablet Take 1 tablet by mouth daily Yes Nima Aburto MD   amLODIPine (NORVASC) 10 MG tablet TAKE ONE TABLET BY MOUTH DAILY Yes Nima Aburto MD   benazepril (LOTENSIN) 20 MG tablet Take 1 tablet by mouth daily Yes Nima Aburto MD   Easy Touch Lancets 33G/Twist MISC USE TO test blood sugar EVERY DAY AS NEEDED Yes Nima Aburto MD   Blood Glucose Monitoring Suppl (SensingStripPRO GLUC MON SYS) w/Device KIT USE TO test blood sugar EVERY DAY AS NEEDED Yes Nima Aburto MD   Alcohol Swabs (ALCOHOL PREP) 70 % PADS USE TO test blood sugar EVERY DAY AS NEEDED Yes Nima Aburto MD   Code71[employer] Usentric GLUCOSE TEST strip USE TO test blood sugar EVERY DAY AS NEEDED Yes Nima Aburto MD   aspirin 81 MG tablet Take 81 mg by mouth daily.  Yes Historical Provider, MD ROSS  MG per tablet TAKE ONE TABLET BY MOUTH TWICE DAILY with meals  Patient not taking: Reported on 10/29/2021  Nima Aburto MD         Past Medical History:   Diagnosis Date    Adhesive capsulitis of shoulder 4/23/2011    Anxiety     h/o anxiety workman comp related. sees a psychologist    Ascending aortic aneurysm ([facility] [address] 9/9/2016 8/16: CT chest :Mild aneurysmal dilation of the ascending aorta measuring up to 4.1 cm. Recheck in one year    Backache, unspecified 7/24/2013    Sees dr. Nilson Clarke    Chronic back pain     Colonoscopy refused     Depression     Diabetes mellitus ([facility] [address]     ED (erectile dysfunction) 1/25/2014    H/O CHF 2003    resolved  EF 25-30 % improved 60% in 3/05    H/O echocardiogram 2011    EF 60 %    Hyperlipidemia     Hypertension     Legionella pneumonia ([facility] [address] 10/27/2016    10/16. Resolved. Seen Katy Lilly.  Limitation due to disability     due to anxiety and agrophobia    Liver dysfunction     blood w/u neg. US fatty liver    Liver dysfunction 10/27/2016    Liver test were abnormal with legionelle pneumonia Now back to normal.    Multiple lung nodules on CT 11/29/2016    Small 2-4 mm nodules on CT in 10/16. F/u in one year. Seen Dr Katy Lilly.     Osteoarthritis     spine, hip and foot per pt. sees Dr. Milana York    Perirectal abscess     resovled       Past Surgical History:   Procedure Laterality Date    APPENDECTOMY      CHOLECYSTECTOMY, LAPAROSCOPIC  05/06    GALLBLADDER SURGERY      PERICARDIUM SURGERY      TONSILLECTOMY      WRIST FRACTURE SURGERY           Family History   Problem Relation Age of Onset    Heart Disease Mother     Coronary Art Dis Mother     Diabetes Mother         diabetes mellitus    Stroke Father     Heart Attack Father     Stroke Sister     Cancer Brother         chronic lymphonic cancer    Cancer Brother         unknown origin    Other Brother         possible leukemia    No Known Problems Brother     No Known Problems Sister     Cancer Sister         chronic lymphonic leukemia    Cancer Sister         unknown origin       CareTeam (Including outside providers/suppliers regularly involved in providing care):   Patient Care Team:  Chase Zuniga MD as PCP - Serafin Velazquez MD as PCP - Davide Cornell Provider    Wt Readings from Last 3 Encounters:   08/31/21 166 lb (75.3 kg)   08/19/21 166 lb (75.3 kg)   08/16/21 166 lb 3.2 oz (75.4 kg)     There were no vitals filed for this visit. There is no height or weight on file to calculate BMI. Based upon direct observation of the patient, evaluation of cognition reveals recent and remote memory intact. Patient's complete Health Risk Assessment and screening values have been reviewed and are found in Flowsheets. The following problems were reviewed today and where indicated follow up appointments were made and/or referrals ordered. Positive Risk Factor Screenings with Interventions:            General Health and ACP:  General  In general, how would you say your health is?: Fair  In the past 7 days, have you experienced any of the following?  New or Increased Pain, New or Increased Fatigue, Loneliness, Social Isolation, Stress or Anger?: (!) New or Increased Pain, Stress, Anger (muscle pain; stress and anger towards his wife's doctor so he fired him)  Do you get the social and emotional support that you need?: Yes  Do you have a Living Will?: Yes  Advance Directives     Power of  Living Will ACP-Advance Directive ACP-Power of     Not on File Not on File Not on File Not on File      General Health Risk Interventions:  · Pain issues: patient advised to follow-up in this office for further evaluation and treatment within 5 day(s)  · Stress: patient's comments regarding reasons for stress and/or anger: Patient states stress due to pt's wife's doctor  · Anger: patient's comments regarding reasons for stress and/or anger: Patient states stress caused him to become angry  · No Living Will: ACP documents already completed- patient asked to provide copy to the office   · Scheduled pt for 11/3/21    Health Habits/Nutrition:  Health Habits/Nutrition  Do you exercise for at least 20 minutes 2-3 times per week?: (!) No  Have you lost any weight without trying in the past 3 months?: No  Do you eat only one meal per day?: No  Have you seen the dentist within the past year?: (!) No (had dentures, but lost them)     Health Habits/Nutrition Interventions:  · Inadequate physical activity:  patient is not ready to increase his/her physical activity level at this time  · Dental exam overdue:  patient encouraged to make appointment with his/her dentist    Hearing/Vision:  No exam data present  Hearing/Vision  Do you or your family notice any trouble with your hearing that hasn't been managed with hearing aids?: No  Do you have difficulty driving, watching TV, or doing any of your daily activities because of your eyesight?: No  Have you had an eye exam within the past year?: (!) No  Hearing/Vision Interventions:  · Vision concerns:  patient encouraged to make appointment with his/her eye specialist      Personalized Preventive Plan   Current Health Maintenance Status  Immunization History   Administered Date(s) Administered    Tdap (Boostrix, Adacel) 09/09/2016        Health Maintenance   Topic Date Due    Pneumococcal 0-64 years Vaccine (1 of 2 - PPSV23) Never done    COVID-19 Vaccine (1) Never done    HIV screen  Never done    Colon Cancer Screen FIT/FOBT  09/21/2018    Diabetic retinal exam  07/19/2019    Diabetic foot exam  03/24/2021    Flu vaccine (1) Never done   [employer] Visit (AWV)  10/24/2021    Shingles Vaccine (1 of 2) 03/03/2023 (Originally 11/21/2009)    Diabetic microalbuminuria test  11/23/2021    Lipid screen  05/03/2022    Potassium monitoring  05/03/2022    Creatinine monitoring  05/03/2022    A1C test (Diabetic or Prediabetic)  08/16/2022    DTaP/Tdap/Td vaccine (2 - Td or Tdap) 09/09/2026    Hepatitis C screen  Completed    Hepatitis A vaccine  Aged Out    Hib vaccine  Aged Out    Meningococcal (ACWY) vaccine  Aged Out     Recommendations for Preventive Services Due: see orders and patient instructions/AVS. Patient states PCP does diabetic foot exams, states he will request diabetic retinal exam at his next office visit. Discussed vaccinations due. Unable to obtain 3 vital signs due to patient not having equipment to take blood pressure/temperature. Recommended screening schedule for the next 5-10 years is provided to the patient in written form: see Patient Instructions/AVS. Patient states he has Cologuard at home now that he needs to do. Silvestre Fontenot LPN, [id number], performed the documented evaluation under the direct supervision of the attending physician. David Barroso, was evaluated through a synchronous (real-time) audio encounter. The patient (or guardian if applicable) is aware that this is a billable service. Verbal consent to proceed has been obtained within the past 12 months. The visit was conducted pursuant to the emergency declaration under the [address] [place] [address], [address] [place] [address] authority and the [employer] General Act. Patient identification was verified, and a caregiver was present when appropriate. The patient was located in the state of [place], where the provider was credentialed to provide care. Total time spent for this encounter: Not billed by time    --Zbigniew Shea LPN on 35/59/4212 at 10:19 AM    An electronic signature was used to authenticate this note.

## 2021-10-29 NOTE — PATIENT INSTRUCTIONS
Personalized Preventive Plan for [facility] [place] - 10/29/2021  Medicare offers a range of preventive health benefits. Some of the tests and screenings are paid in full while other may be subject to a deductible, co-insurance, and/or copay. Some of these benefits include a comprehensive review of your medical history including lifestyle, illnesses that may run in your family, and various assessments and screenings as appropriate. After reviewing your medical record and screening and assessments performed today your provider may have ordered immunizations, labs, imaging, and/or referrals for you. A list of these orders (if applicable) as well as your Preventive Care list are included within your After Visit Summary for your review. Other Preventive Recommendations:    · A preventive eye exam performed by an eye specialist is recommended every 1-2 years to screen for glaucoma; cataracts, macular degeneration, and other eye disorders. · A preventive dental visit is recommended every 6 months. · Try to get at least 150 minutes of exercise per week or 10,000 steps per day on a pedometer . · Order or download the FREE \"Exercise & Physical Activity: Your Everyday Guide\" from [employer]. Call 8-441.804.2849 or search [employer] online. · You need 6230-9342 mg of calcium and 2321-1487 IU of vitamin D per day. It is possible to meet your calcium requirement with diet alone, but a vitamin D supplement is usually necessary to meet this goal.  · When exposed to the sun, use a sunscreen that protects against both UVA and UVB radiation with an SPF of 30 or greater. Reapply every 2 to 3 hours or after sweating, drying off with a towel, or swimming. · Always wear a seat belt when traveling in a car. Always wear a helmet when riding a bicycle or motorcycle.

## 2021-11-03 ENCOUNTER — OFFICE VISIT (OUTPATIENT)
Dept: INTERNAL MEDICINE CLINIC | Age: 61
End: 2021-11-03
Payer: MEDICARE

## 2021-11-03 VITALS
SYSTOLIC BLOOD PRESSURE: 124 MMHG | DIASTOLIC BLOOD PRESSURE: 64 MMHG | TEMPERATURE: 98.5 F | BODY MASS INDEX: 28.53 KG/M2 | OXYGEN SATURATION: 98 % | WEIGHT: 166.2 LBS | RESPIRATION RATE: 16 BRPM | HEART RATE: 60 BPM

## 2021-11-03 DIAGNOSIS — I71.21 ASCENDING AORTIC ANEURYSM: ICD-10-CM

## 2021-11-03 DIAGNOSIS — E78.2 MIXED HYPERLIPIDEMIA: ICD-10-CM

## 2021-11-03 DIAGNOSIS — R91.8 MULTIPLE LUNG NODULES ON CT: ICD-10-CM

## 2021-11-03 DIAGNOSIS — F41.9 ANXIETY: ICD-10-CM

## 2021-11-03 DIAGNOSIS — E11.9 TYPE 2 DIABETES MELLITUS WITHOUT COMPLICATION, WITHOUT LONG-TERM CURRENT USE OF INSULIN (HCC): ICD-10-CM

## 2021-11-03 DIAGNOSIS — M19.90 OSTEOARTHRITIS, UNSPECIFIED OSTEOARTHRITIS TYPE, UNSPECIFIED SITE: ICD-10-CM

## 2021-11-03 DIAGNOSIS — G89.29 CHRONIC MIDLINE LOW BACK PAIN WITHOUT SCIATICA: ICD-10-CM

## 2021-11-03 DIAGNOSIS — I10 ESSENTIAL HYPERTENSION: ICD-10-CM

## 2021-11-03 DIAGNOSIS — M54.50 CHRONIC MIDLINE LOW BACK PAIN WITHOUT SCIATICA: ICD-10-CM

## 2021-11-03 LAB
CHP ED QC CHECK: ABNORMAL
GLUCOSE BLD-MCNC: 398 MG/DL

## 2021-11-03 PROCEDURE — 3051F HG A1C>EQUAL 7.0%<8.0%: CPT | Performed by: INTERNAL MEDICINE

## 2021-11-03 PROCEDURE — 99213 OFFICE O/P EST LOW 20 MIN: CPT | Performed by: INTERNAL MEDICINE

## 2021-11-03 PROCEDURE — 82962 GLUCOSE BLOOD TEST: CPT | Performed by: INTERNAL MEDICINE

## 2021-11-03 RX ORDER — GLIPIZIDE 2.5 MG/1
2.5 TABLET, EXTENDED RELEASE ORAL
Qty: 60 TABLET | Refills: 3 | Status: SHIPPED | OUTPATIENT
Start: 2021-11-03 | End: 2021-12-23 | Stop reason: SDUPTHER

## 2021-11-03 RX ORDER — METFORMIN HYDROCHLORIDE 500 MG/1
1000 TABLET, EXTENDED RELEASE ORAL 2 TIMES DAILY WITH MEALS
Qty: 120 TABLET | Refills: 5 | Status: SHIPPED | OUTPATIENT
Start: 2021-11-03 | End: 2022-02-04

## 2021-11-03 RX ORDER — TETANUS TOXOID, REDUCED DIPHTHERIA TOXOID AND ACELLULAR PERTUSSIS VACCINE, ADSORBED 5; 2.5; 8; 8; 2.5 [IU]/.5ML; [IU]/.5ML; UG/.5ML; UG/.5ML; UG/.5ML
SUSPENSION INTRAMUSCULAR
COMMUNITY

## 2021-11-03 ASSESSMENT — PATIENT HEALTH QUESTIONNAIRE - PHQ9
1. LITTLE INTEREST OR PLEASURE IN DOING THINGS: 0
2. FEELING DOWN, DEPRESSED OR HOPELESS: 0
SUM OF ALL RESPONSES TO PHQ QUESTIONS 1-9: 0
SUM OF ALL RESPONSES TO PHQ QUESTIONS 1-9: 0
SUM OF ALL RESPONSES TO PHQ9 QUESTIONS 1 & 2: 0
SUM OF ALL RESPONSES TO PHQ QUESTIONS 1-9: 0

## 2021-11-03 NOTE — ASSESSMENT & PLAN NOTE
Pt has DM since ? 2007  Sees Ophthalmologist once a year  change to Janumet 50/1000 mg bid.  . Pt stopped taking it as it expensive  Taking only Glipizide 2.5 mg daily

## 2021-11-03 NOTE — PROGRESS NOTES
Bijal Escobar  Patient's  is 1960  Seen in office on 11/3/2021      SUBJECTIVE:  Julius Toro sonia 63 y. o.year old male presents today   Chief Complaint   Patient presents with    Discuss Medications     not taking janumet due to cost     Patient is here for follow-up of hypertension, diabetes, anxiety, hyperlipidemia  Seen Dr Dmitriy Rios for ascending aortic aneurysm   He ordered echo and recommended f/ CT chest and echo in one year   Patient states he is feeling well and has no major complaints. Patient has DM. He stopped taking Janumet as he was not able to afford it. His blood sugars are running high. Patient has hypertension. Taking medications No headaches, no chest pain, no palpitations and no dizziness. Patient has hyperlipidemia. Taking medications. No abdominal pain, no nausea or vomiting. No myalgias. Taking medications regularly. No side effects noted. Review of Systems  Review of system normal except as in HPI  OBJECTIVE: /64   Pulse 60   Temp 98.5 °F (36.9 °C) (Oral)   Resp 16   Wt 166 lb 3.2 oz (75.4 kg)   SpO2 98%   BMI 28.53 kg/m²     Wt Readings from Last 3 Encounters:   21 166 lb 3.2 oz (75.4 kg)   21 166 lb (75.3 kg)   21 166 lb (75.3 kg)      Patient was seen taking COVID-19 precautions. Face mask, gloves were used. Patient also wore facemask. GENERAL: - Alert, oriented, pleasant, in no apparent distress. HEENT: - Conjunctiva pink, no scleral icterus. ENT clear. NECK: -Supple. No jugular venous distention noted. No masses felt,  CARDIOVASCULAR: - Normal S1 and S2    PULMONARY: - No respiratory distress. No wheezes or rales. ABDOMEN: - Soft and non-tender,no masses  ororganomegaly. EXTREMITIES: - No cyanosis, clubbing, or significant edema. SKIN: Skin is warm and dry. NEUROLOGICAL: - Cranial nerves II through XII are grossly intact. IMPRESSION:    Encounter Diagnoses   Name Primary?     Type 2 diabetes mellitus without complication, without long-term current use of insulin (HCC)     Essential hypertension     Anxiety     Mixed hyperlipidemia     Osteoarthritis, unspecified osteoarthritis type, unspecified site     Ascending aortic aneurysm (HCC)     Multiple lung nodules on CT     Chronic midline low back pain without sciatica        ASSESSMENT/PLAN:    Type 2 diabetes mellitus without complication ([facility] [address]    Pt has DM since ? 2007  Sees Ophthalmologist once a year  Was on Janumet 50/1000 mg bid. . Pt stopped taking it as it expensive  Taking only Glipizide 2.5 mg daily   todays BS is 398 random. Had [employer] meals 4 hours ago. Change medication to metformin 1000 mg bid   Increase glipizide Er 2.5 mg bid. Check blood sugars at home and call if it is over 200. Essential hypertension    Patient is on amlodipine, benazepril and carvedilol  Continue current treatment. Anxiety  Patient is stable. Continue current treatment. Mixed hyperlipidemia    Hyperlipidemia in good control  On atorvastatin 40 mg daily  Patient is stable. Continue current treatment. Follow diet. Osteoarthritis    Arthritis is stabl.e     Ascending aortic aneurysm ([facility] [address]    7/2020. Ascending aortic ectasia at 4 cm : stable   Seen Dr Shavon Bangura for ascending aortic aneurysm   He ordered echo and recommended f/ CT chest and echo in one year     Multiple lung nodules on CT: Look benign       Chronic midline low back pain without sciatica    Not taking any medications    Orders Placed This Encounter   Procedures    Comprehensive Metabolic Panel    Lipid, Fasting    POCT Glucose     Return to office in 3 months. Time spent 25 minutes    Mediations reviewed with the patient. Continue current medications. Appropriate prescriptions are addressed. After visit summeryprovided. Follow up as directed sooner if needed. Questions answered and patient verbalizes understanding. Call for any problems, questions, or concerns.        Allergies   Allergen Reactions    Etodolac Other (See Comments)     Drop in bp     Efudex [Fluorouracil]      Current Outpatient Medications   Medication Sig Dispense Refill    glipiZIDE (GLUCOTROL XL) 2.5 MG extended release tablet Take 1 tablet by mouth daily 90 tablet 1    carvedilol (COREG) 12.5 MG tablet TAKE ONE TABLET BY MOUTH TWICE DAILY with meals 180 tablet 1    atorvastatin (LIPITOR) 40 MG tablet Take 1 tablet by mouth daily 90 tablet 1    amLODIPine (NORVASC) 10 MG tablet TAKE ONE TABLET BY MOUTH DAILY 90 tablet 1    benazepril (LOTENSIN) 20 MG tablet Take 1 tablet by mouth daily 90 tablet 1    aspirin 81 MG tablet Take 81 mg by mouth daily.  tetanus-diphth-acell pertussis (BOOSTRIX) 5-2.5-18.5 LF-MCG/0.5 SAMI injection Boostrix Tdap 2.5 Lf unit-8 mcg-5 Lf/0.5 mL intramuscular syringe      JANUMET  MG per tablet TAKE ONE TABLET BY MOUTH TWICE DAILY with meals (Patient not taking: Reported on 10/29/2021) 180 tablet 1    Easy Touch Lancets 33G/Twist MISC USE TO test blood sugar EVERY DAY AS NEEDED 100 each 1    Blood Glucose Monitoring Suppl ([employer]PRO GLUC MON SYS) w/Device KIT USE TO test blood sugar EVERY DAY AS NEEDED 1 kit 1    Alcohol Swabs (ALCOHOL PREP) 70 % PADS USE TO test blood sugar EVERY DAY AS NEEDED 100 each 1    [employer]PRO GLUCOSE TEST strip USE TO test blood sugar EVERY DAY AS NEEDED 50 strip 1     No current facility-administered medications for this visit. Past Medical History:   Diagnosis Date    Adhesive capsulitis of shoulder 4/23/2011    Anxiety     h/o anxiety workman comp related. sees a psychologist    Ascending aortic aneurysm ([facility] [address] 9/9/2016 8/16: CT chest :Mild aneurysmal dilation of the ascending aorta measuring up to 4.1 cm.  Recheck in one year    Backache, unspecified 7/24/2013    Sees dr. Ugo Sutherland    Chronic back pain     Colonoscopy refused     Depression     Diabetes mellitus ([facility] [address]     ED (erectile dysfunction) 1/25/2014    H/O CHF     resolved  EF 25-30 % improved 60% in 3/05    H/O echocardiogram     EF 60 %    Hyperlipidemia     Hypertension     Legionella pneumonia ([facility] [address] 10/27/2016    10/16. Resolved. Seen Diana Garcia.  Limitation due to disability     due to anxiety and agrophobia    Liver dysfunction     blood w/u neg. US fatty liver    Liver dysfunction 10/27/2016    Liver test were abnormal with legionelle pneumonia Now back to normal.    Multiple lung nodules on CT 2016    Small 2-4 mm nodules on CT in 10/16. F/u in one year. Seen Dr Diana Garcia.     Osteoarthritis     spine, hip and foot per pt. sees Dr. Sada Lozano    Perirectal abscess     resovled     Past Surgical History:   Procedure Laterality Date    APPENDECTOMY      CHOLECYSTECTOMY, LAPAROSCOPIC      GALLBLADDER SURGERY      PERICARDIUM SURGERY      TONSILLECTOMY      WRIST FRACTURE SURGERY       Social History     Tobacco Use    Smoking status: Former Smoker     Packs/day: 0.10     Years: 2.00     Pack years: 0.20     Types: Cigarettes     Quit date: 1983     Years since quittin.8    Smokeless tobacco: Never Used   Substance Use Topics    Alcohol use: No     Alcohol/week: 0.0 standard drinks       LAB REVIEW:  CBC:   Lab Results   Component Value Date    WBC 6.4 2020    HGB 14.8 2020    HCT 45.0 2020     2020     Lipids:   Lab Results   Component Value Date    HDL 34 (L) 2021    LDLDIRECT 89 2021    TRIGLYCFAST 203 (H) 2021    CHOLFAST 149 2021     Renal:   Lab Results   Component Value Date    BUN 15 2021    CREATININE 1.0 2021     2021    K 4.3 2021    ALT 26 2021    AST 19 2021    GLUCOSE 170 2021    GLUF 133 10/23/2020     PT/INR:   Lab Results   Component Value Date    INR 1.29 10/10/2016     A1C:   Lab Results   Component Value Date    LABA1C 7.5 2021           Margie Mclaughlin MD, 11/3/2021 , 2:12 PM

## 2021-12-01 ENCOUNTER — HOSPITAL ENCOUNTER (OUTPATIENT)
Age: 61
Discharge: HOME | End: 2021-12-01
Payer: MEDICARE

## 2021-12-01 LAB
ALBUMIN SERPL-MCNC: 4.6 GM/DL (ref 3.4–5)
ALP BLD-CCNC: 107 IU/L (ref 40–129)
ALT SERPL-CCNC: 24 U/L (ref 10–40)
ANION GAP SERPL CALCULATED.3IONS-SCNC: 8 MMOL/L (ref 4–16)
AST SERPL-CCNC: 15 IU/L (ref 15–37)
BILIRUB SERPL-MCNC: 0.4 MG/DL (ref 0–1)
BUN BLDV-MCNC: 18 MG/DL (ref 6–23)
CALCIUM SERPL-MCNC: 9.4 MG/DL (ref 8.3–10.6)
CHLORIDE BLD-SCNC: 103 MMOL/L (ref 99–110)
CHOLESTEROL, FASTING: 148 MG/DL
CO2: 30 MMOL/L (ref 21–32)
CREAT SERPL-MCNC: 0.9 MG/DL (ref 0.9–1.3)
GFR AFRICAN AMERICAN: >60 ML/MIN/1.73M2
GFR NON-AFRICAN AMERICAN: >60 ML/MIN/1.73M2
GLUCOSE FASTING: 139 MG/DL (ref 70–99)
HDLC SERPL-MCNC: 38 MG/DL
LDL CHOLESTEROL DIRECT: 90 MG/DL
POTASSIUM SERPL-SCNC: 5.1 MMOL/L (ref 3.5–5.1)
SODIUM BLD-SCNC: 141 MMOL/L (ref 135–145)
TOTAL PROTEIN: 6.9 GM/DL (ref 6.4–8.2)
TRIGLYCERIDE, FASTING: 135 MG/DL

## 2021-12-01 PROCEDURE — 36415 COLL VENOUS BLD VENIPUNCTURE: CPT

## 2021-12-01 PROCEDURE — 80053 COMPREHEN METABOLIC PANEL: CPT

## 2021-12-01 PROCEDURE — 80061 LIPID PANEL: CPT

## 2021-12-23 ENCOUNTER — TELEPHONE (OUTPATIENT)
Dept: INTERNAL MEDICINE CLINIC | Age: 61
End: 2021-12-23

## 2021-12-23 RX ORDER — GLIPIZIDE 2.5 MG/1
2.5 TABLET, EXTENDED RELEASE ORAL
Qty: 60 TABLET | Refills: 3 | Status: SHIPPED | OUTPATIENT
Start: 2021-12-23 | End: 2022-02-04 | Stop reason: SDUPTHER

## 2021-12-23 NOTE — TELEPHONE ENCOUNTER
Per pharmacy they did not receive the November script for patient's glipizide with increase of taking twice per day. They will need this resent in order for patient to  medication. They state the last script they have on file is from October and it stated only once per day.

## 2022-02-04 ENCOUNTER — VIRTUAL VISIT (OUTPATIENT)
Dept: INTERNAL MEDICINE CLINIC | Age: 62
End: 2022-02-04
Payer: MEDICARE

## 2022-02-04 DIAGNOSIS — E78.2 MIXED HYPERLIPIDEMIA: ICD-10-CM

## 2022-02-04 DIAGNOSIS — F41.9 ANXIETY: ICD-10-CM

## 2022-02-04 DIAGNOSIS — R91.8 MULTIPLE LUNG NODULES ON CT: ICD-10-CM

## 2022-02-04 DIAGNOSIS — M19.90 OSTEOARTHRITIS, UNSPECIFIED OSTEOARTHRITIS TYPE, UNSPECIFIED SITE: ICD-10-CM

## 2022-02-04 DIAGNOSIS — E11.9 TYPE 2 DIABETES MELLITUS WITHOUT COMPLICATION, WITHOUT LONG-TERM CURRENT USE OF INSULIN (HCC): ICD-10-CM

## 2022-02-04 DIAGNOSIS — I10 ESSENTIAL HYPERTENSION: ICD-10-CM

## 2022-02-04 DIAGNOSIS — I71.21 ASCENDING AORTIC ANEURYSM: ICD-10-CM

## 2022-02-04 PROCEDURE — 99443 PR PHYS/QHP TELEPHONE EVALUATION 21-30 MIN: CPT | Performed by: INTERNAL MEDICINE

## 2022-02-04 RX ORDER — GLIPIZIDE 2.5 MG/1
2.5 TABLET, EXTENDED RELEASE ORAL
Qty: 60 TABLET | Refills: 5 | Status: SHIPPED | OUTPATIENT
Start: 2022-02-04 | End: 2022-08-16 | Stop reason: SDUPTHER

## 2022-02-04 RX ORDER — SITAGLIPTIN AND METFORMIN HYDROCHLORIDE 1000; 50 MG/1; MG/1
TABLET, FILM COATED ORAL
COMMUNITY
Start: 2022-01-06 | End: 2022-02-04 | Stop reason: SDUPTHER

## 2022-02-04 RX ORDER — SITAGLIPTIN AND METFORMIN HYDROCHLORIDE 1000; 50 MG/1; MG/1
1 TABLET, FILM COATED ORAL 2 TIMES DAILY WITH MEALS
Qty: 60 TABLET | Refills: 5 | Status: SHIPPED | OUTPATIENT
Start: 2022-02-04 | End: 2022-07-18

## 2022-02-04 NOTE — ASSESSMENT & PLAN NOTE
.  Patient has history of anxiety attacks. Has not seen psychologist for almost 2 years because of COVID-19. [place]   He is a stable

## 2022-02-04 NOTE — PROGRESS NOTES
Rosalinda Do is a 57 y.o. male evaluated via telephone on 2/4/2022. Consent:  He and/or health care decision maker is aware that that he may receive a bill for this telephone service, which includes applicable co-pays, depending on his insurance coverage, and has provided verbal consent to proceed. Patient is unable to do the video visit therefore switched to telephone. Documentation:  I communicated with the patient and/or health care decision maker about     Diabetes  Hypertension  Anxiety  Patient has diabetes mellitus and was taking Metformin and glipizide 2.5 mg twice a day. He was not able to afford Janumet. Now patient states he is out of the donut hole and he cannot afford the Janumet. Patient does not check the blood sugars at home. Denies any hypoglycemic symptoms. Denies any chest pain. No shortness of breath no cough or sputum production. No abdominal pain. No nausea vomiting or diarrhea. .  Patient has anxiety also but has not seen the counselor for almost 2 years because of the COVID-19. He is feeling well. If he gets anxiety attacks he goes home and rest and do some anxiety relieving techniques taught by psychologist.  Overall he is feeling well. .   Details of this discussion including any medical advice provided:     Type 2 diabetes mellitus without complication ([facility] [address]    Pt has DM since ? 2007  Sees Ophthalmologist once a year  change to Janumet 50/1000 mg bid . Pt stopped taking it as it expensive. He started taking again as insurance is covering it. Taking glipizide ER 2.5 mg twice a day  Advised patient to check the blood sugars regularly    Essential hypertension    for hypertension patient takes amlodipine, Benzapril and carvedilol. Continue the same. No dizziness and no chest pain    Anxiety   . Patient has history of anxiety attacks. Has not seen psychologist for almost 2 years because of COVID-19. Elin Mckeon He is a stable    Mixed hyperlipidemia   .   Lipid profile was

## 2022-02-04 NOTE — ASSESSMENT & PLAN NOTE
for hypertension patient takes amlodipine, Benzapril and carvedilol. Continue the same.   No dizziness and no chest pain

## 2022-02-04 NOTE — ASSESSMENT & PLAN NOTE
.  Patient is seeing cardiovascular surgeon for follow-up. He is stable. He is supposed to get another CT scan of the chest sometime this year.

## 2022-05-16 ENCOUNTER — OFFICE VISIT (OUTPATIENT)
Dept: INTERNAL MEDICINE CLINIC | Age: 62
End: 2022-05-16
Payer: MEDICARE

## 2022-05-16 VITALS
TEMPERATURE: 97 F | OXYGEN SATURATION: 96 % | HEART RATE: 62 BPM | RESPIRATION RATE: 16 BRPM | DIASTOLIC BLOOD PRESSURE: 64 MMHG | WEIGHT: 164 LBS | SYSTOLIC BLOOD PRESSURE: 118 MMHG | BODY MASS INDEX: 28.15 KG/M2

## 2022-05-16 DIAGNOSIS — I71.21 ASCENDING AORTIC ANEURYSM: ICD-10-CM

## 2022-05-16 DIAGNOSIS — G89.29 CHRONIC MIDLINE LOW BACK PAIN WITHOUT SCIATICA: ICD-10-CM

## 2022-05-16 DIAGNOSIS — E11.9 TYPE 2 DIABETES MELLITUS WITHOUT COMPLICATION, WITHOUT LONG-TERM CURRENT USE OF INSULIN (HCC): Primary | ICD-10-CM

## 2022-05-16 DIAGNOSIS — E78.2 MIXED HYPERLIPIDEMIA: ICD-10-CM

## 2022-05-16 DIAGNOSIS — M19.90 OSTEOARTHRITIS, UNSPECIFIED OSTEOARTHRITIS TYPE, UNSPECIFIED SITE: ICD-10-CM

## 2022-05-16 DIAGNOSIS — Z12.5 SCREENING PSA (PROSTATE SPECIFIC ANTIGEN): ICD-10-CM

## 2022-05-16 DIAGNOSIS — I10 ESSENTIAL HYPERTENSION: ICD-10-CM

## 2022-05-16 DIAGNOSIS — F41.9 ANXIETY: ICD-10-CM

## 2022-05-16 DIAGNOSIS — M54.50 CHRONIC MIDLINE LOW BACK PAIN WITHOUT SCIATICA: ICD-10-CM

## 2022-05-16 LAB — HBA1C MFR BLD: 6.5 %

## 2022-05-16 PROCEDURE — 3044F HG A1C LEVEL LT 7.0%: CPT | Performed by: INTERNAL MEDICINE

## 2022-05-16 PROCEDURE — 83036 HEMOGLOBIN GLYCOSYLATED A1C: CPT | Performed by: INTERNAL MEDICINE

## 2022-05-16 PROCEDURE — 99214 OFFICE O/P EST MOD 30 MIN: CPT | Performed by: INTERNAL MEDICINE

## 2022-05-16 RX ORDER — AMLODIPINE BESYLATE 10 MG/1
TABLET ORAL
Qty: 90 TABLET | Refills: 1 | Status: SHIPPED | OUTPATIENT
Start: 2022-05-16

## 2022-05-16 RX ORDER — ATORVASTATIN CALCIUM 40 MG/1
40 TABLET, FILM COATED ORAL DAILY
Qty: 90 TABLET | Refills: 1 | Status: SHIPPED | OUTPATIENT
Start: 2022-05-16

## 2022-05-16 RX ORDER — BENAZEPRIL HYDROCHLORIDE 20 MG/1
20 TABLET ORAL DAILY
Qty: 90 TABLET | Refills: 1 | Status: SHIPPED | OUTPATIENT
Start: 2022-05-16

## 2022-05-16 RX ORDER — CARVEDILOL 12.5 MG/1
TABLET ORAL
Qty: 180 TABLET | Refills: 1 | Status: SHIPPED | OUTPATIENT
Start: 2022-05-16

## 2022-05-16 ASSESSMENT — PATIENT HEALTH QUESTIONNAIRE - PHQ9
SUM OF ALL RESPONSES TO PHQ9 QUESTIONS 1 & 2: 0
2. FEELING DOWN, DEPRESSED OR HOPELESS: 0
SUM OF ALL RESPONSES TO PHQ QUESTIONS 1-9: 0
1. LITTLE INTEREST OR PLEASURE IN DOING THINGS: 0
SUM OF ALL RESPONSES TO PHQ QUESTIONS 1-9: 0

## 2022-05-16 ASSESSMENT — ENCOUNTER SYMPTOMS
GASTROINTESTINAL NEGATIVE: 1
RESPIRATORY NEGATIVE: 1
EYES NEGATIVE: 1
ALLERGIC/IMMUNOLOGIC NEGATIVE: 1

## 2022-05-16 NOTE — PROGRESS NOTES
Snehal Quinteros  Patient's  is 1960  Seen in office on 2022      SUBJECTIVE:  Srikanth Matute sonia 57 y. o.year old male presents today   Chief Complaint   Patient presents with    Follow-up    Medication Refill     Patient is here for follow-up of diabetes, hypertension, hyperlipidemia, anxiety  Patient has DM. No hypoglycemia. No numbness or weakness. No dizziness. Patient has hypertension. Taking medications No headaches, no chest pain, no palpitations and no dizziness. Patient has hyperlipidemia. Taking medications. No abdominal pain, no nausea or vomiting. No myalgias. Anxiety is stable. Not seen psychologist     Taking medications regularly. No side effects noted. Review of Systems   Constitutional: Negative. Negative for chills, diaphoresis and fever. HENT: Negative. Eyes: Negative. Respiratory: Negative. Cardiovascular: Negative. Gastrointestinal: Negative. Endocrine: Negative. Genitourinary: Negative. Musculoskeletal: Negative. Skin: Negative. Allergic/Immunologic: Negative. Neurological: Negative. Hematological: Negative. Psychiatric/Behavioral: Negative. OBJECTIVE: /64   Pulse 62   Temp 97 °F (36.1 °C)   Resp 16   Wt 164 lb (74.4 kg)   SpO2 96%   BMI 28.15 kg/m²     Wt Readings from Last 3 Encounters:   22 164 lb (74.4 kg)   21 166 lb 3.2 oz (75.4 kg)   21 166 lb (75.3 kg)        Patient was seen taking COVID-19 precautions. Face mask, gloves were used. Patient also wore facemask. GENERAL: - Alert, oriented, pleasant, in no apparent distress. HEENT: - Conjunctiva pink, no scleral icterus. ENT clear. NECK: -Supple. No jugular venous distention noted. No masses felt,  CARDIOVASCULAR: - Normal S1 and S2    PULMONARY: - No respiratory distress. No wheezes or rales. ABDOMEN: - Soft and non-tender,no masses  ororganomegaly. EXTREMITIES: - No cyanosis, clubbing, or significant edema.   SKIN: Skin is warm and dry.   NEUROLOGICAL: - Cranial nerves II through XII are grossly intact. Refused foot exam :    IMPRESSION:    Encounter Diagnoses   Name Primary?  Type 2 diabetes mellitus without complication, without long-term current use of insulin (HCC) Yes    Mixed hyperlipidemia     Essential hypertension     Osteoarthritis, unspecified osteoarthritis type, unspecified site     Ascending aortic aneurysm (HCC)     Chronic midline low back pain without sciatica     Anxiety        ASSESSMENT/PLAN:     1. Mixed hyperlipidemia  Hyperlipidemia is stable. Follow low cholesterol diet. Continue current treatment. - atorvastatin (LIPITOR) 40 MG tablet; Take 1 tablet by mouth daily  Dispense: 90 tablet; Refill: 1  - CBC with Auto Differential; Future  - Comprehensive Metabolic Panel; Future  - Lipid, Fasting; Future    2. Type 2 diabetes mellitus without complication, without long-term current use of insulin ([address]  Patient is stable. Continue current treatment.  hga1c is 6.5  Follow diet     - POCT glycosylated hemoglobin (Hb A1C)    3. Essential hypertension  Hypertension in control. Follow low salt diet. Continue current treatment. 4. Osteoarthritis, unspecified osteoarthritis type, unspecified site  Take tylenol prn    5. Ascending aortic aneurysm [facility])  Patient is stable. Continue current treatment. 6. Chronic midline low back pain without sciatica  Low back pain chronic  Take tylenol    7. Anxiety  Patient is stable. Continue current treatment. Pt states he is not seen psychologist since covid 19 pandemic    8. Screening PSA (prostate specific antigen)  Will order   - PSA Screening; Future    Orders Placed This Encounter   Procedures    CBC with Auto Differential    Comprehensive Metabolic Panel    Lipid, Fasting    PSA Screening    POCT glycosylated hemoglobin (Hb A1C)             Mediations reviewed with the patient. Continue current medications. Appropriate prescriptions are addressed.  After visit summeryprovided. Follow up as directed sooner if needed. Questions answered and patient verbalizes understanding. Call for any problems, questions, or concerns. Allergies   Allergen Reactions    Etodolac Other (See Comments)     Drop in bp     Efudex [Fluorouracil]      Current Outpatient Medications   Medication Sig Dispense Refill    JANUMET  MG per tablet Take 1 tablet by mouth 2 times daily (with meals) 60 tablet 5    glipiZIDE (GLUCOTROL XL) 2.5 MG extended release tablet Take 1 tablet by mouth 2 times daily (before meals) 60 tablet 5    carvedilol (COREG) 12.5 MG tablet TAKE ONE TABLET BY MOUTH TWICE DAILY with meals 180 tablet 1    atorvastatin (LIPITOR) 40 MG tablet Take 1 tablet by mouth daily 90 tablet 1    amLODIPine (NORVASC) 10 MG tablet TAKE ONE TABLET BY MOUTH DAILY 90 tablet 1    benazepril (LOTENSIN) 20 MG tablet Take 1 tablet by mouth daily 90 tablet 1    aspirin 81 MG tablet Take 81 mg by mouth daily.  tetanus-diphth-acell pertussis (BOOSTRIX) 5-2.5-18.5 LF-MCG/0.5 SAMI injection Boostrix Tdap 2.5 Lf unit-8 mcg-5 Lf/0.5 mL intramuscular syringe      Easy Touch Lancets 33G/Twist MISC USE TO test blood sugar EVERY DAY AS NEEDED 100 each 1    Blood Glucose Monitoring Suppl ([employer] GLUC MON SYS) w/Device KIT USE TO test blood sugar EVERY DAY AS NEEDED 1 kit 1    Alcohol Swabs (ALCOHOL PREP) 70 % PADS USE TO test blood sugar EVERY DAY AS NEEDED 100 each 1    [email or web address] GLUCOSE TEST strip USE TO test blood sugar EVERY DAY AS NEEDED 50 strip 1     No current facility-administered medications for this visit. Past Medical History:   Diagnosis Date    Adhesive capsulitis of shoulder 4/23/2011    Anxiety     h/o anxiety workman comp related. sees a psychologist    Ascending aortic aneurysm ([facility] [address] 9/9/2016 8/16: CT chest :Mild aneurysmal dilation of the ascending aorta measuring up to 4.1 cm.  Recheck in one year    Backache, unspecified 7/24/2013 Sees dr. Bryon Burgess    Chronic back pain     Colonoscopy refused     Depression     Diabetes mellitus ([facility] [address]     ED (erectile dysfunction) 2014    H/O CHF 2003    resolved  EF 25-30 % improved 60% in 3/05    H/O echocardiogram     EF 60 %    Hyperlipidemia     Hypertension     Legionella pneumonia ([facility] [address] 10/27/2016    10/16. Resolved. Seen Nany Tapia.  Limitation due to disability     due to anxiety and agrophobia    Liver dysfunction     blood w/u neg. US fatty liver    Liver dysfunction 10/27/2016    Liver test were abnormal with legionelle pneumonia Now back to normal.    Multiple lung nodules on CT 2016    Small 2-4 mm nodules on CT in 10/16. F/u in one year. Seen Dr Nany Tapia.     Osteoarthritis     spine, hip and foot per pt. sees Dr. Silvia Delacruz    Perirectal abscess     resovled     Past Surgical History:   Procedure Laterality Date    APPENDECTOMY      CHOLECYSTECTOMY, LAPAROSCOPIC      GALLBLADDER SURGERY      PERICARDIUM SURGERY      TONSILLECTOMY      WRIST FRACTURE SURGERY       Social History     Tobacco Use    Smoking status: Former Smoker     Packs/day: 0.10     Years: 2.00     Pack years: 0.20     Types: Cigarettes     Quit date: 1983     Years since quittin.3    Smokeless tobacco: Never Used   Substance Use Topics    Alcohol use: No     Alcohol/week: 0.0 standard drinks       LAB REVIEW:  CBC:   Lab Results   Component Value Date    WBC 6.4 2020    HGB 14.8 2020    HCT 45.0 2020     2020     Lipids:   Lab Results   Component Value Date    HDL 38 (L) 2021    LDLDIRECT 90 2021    TRIGLYCFAST 135 2021    CHOLFAST 148 2021     Renal:   Lab Results   Component Value Date    BUN 18 2021    CREATININE 0.9 2021     2021    K 5.1 2021    ALT 24 2021    AST 15 2021    GLUCOSE 398 2021    GLUF 139 2021     PT/INR:   Lab Results   Component Value Date INR 1.29 10/10/2016     A1C:   Lab Results   Component Value Date    LABA1C 6.5 05/16/2022           Peyton Padgett MD, 5/16/2022 , 8:35 AM

## 2022-05-17 NOTE — ASSESSMENT & PLAN NOTE
. Pt has DM since ? 2007  Sees Ophthalmologist once a year  change to Janumet 50/1000 mg bid . Pt stopped taking it as it expensive. He started taking again as insurance is covering it.   Taking glipizide ER 2.5 mg twice a day  Advised patient to check the blood sugars regularly

## 2022-07-18 RX ORDER — SITAGLIPTIN AND METFORMIN HYDROCHLORIDE 1000; 50 MG/1; MG/1
TABLET, FILM COATED ORAL
Qty: 60 TABLET | Refills: 5 | Status: SHIPPED | OUTPATIENT
Start: 2022-07-18

## 2022-07-18 NOTE — TELEPHONE ENCOUNTER
Medication:   Requested Prescriptions     Pending Prescriptions Disp Refills    JANUMET  MG per tablet [Pharmacy Med Name: Mary Carmen Hickey 50 mg-1,000 mg tablet] 60 tablet 5     Sig: TAKE ONE TABLET BY MOUTH TWICE DAILY with meals       Last Filled:      Patient Phone Number: [phone] (home)     Last appt: 5/16/2022   Next appt: 8/16/2022    Last Labs DM:   Lab Results   Component Value Date/Time    LABA1C 6.5 05/16/2022 08:53 AM     Last Lipid:   Lab Results   Component Value Date/Time    CHOL 135 02/15/2017 08:20 AM    TRIG 205 02/15/2017 08:20 AM    HDL 38 12/01/2021 09:40 AM     Last PSA:   Lab Results   Component Value Date/Time    PSA 1.02 11/23/2020 07:05 AM     Last Thyroid: No results found for: TSH, FT3, [id number], [place], [id number]

## 2022-08-08 RX ORDER — GLIPIZIDE 2.5 MG/1
TABLET, EXTENDED RELEASE ORAL
Qty: 60 TABLET | Refills: 5 | OUTPATIENT
Start: 2022-08-08

## 2022-08-10 ENCOUNTER — HOSPITAL ENCOUNTER (OUTPATIENT)
Age: 62
Discharge: HOME | End: 2022-08-10
Payer: MEDICARE

## 2022-08-10 ENCOUNTER — HOSPITAL ENCOUNTER (OUTPATIENT)
Age: 62
Setting detail: SPECIMEN
Discharge: HOME | End: 2022-08-10

## 2022-08-10 DIAGNOSIS — E11.9 TYPE 2 DIABETES MELLITUS WITHOUT COMPLICATION, WITHOUT LONG-TERM CURRENT USE OF INSULIN (HCC): Primary | ICD-10-CM

## 2022-08-10 LAB
ALBUMIN SERPL-MCNC: 4.6 GM/DL (ref 3.4–5)
ALP BLD-CCNC: 92 IU/L (ref 40–129)
ALT SERPL-CCNC: 21 U/L (ref 10–40)
ANION GAP SERPL CALCULATED.3IONS-SCNC: 12 MMOL/L (ref 4–16)
AST SERPL-CCNC: 19 IU/L (ref 15–37)
BASOPHILS ABSOLUTE: 0.1 K/CU MM
BASOPHILS RELATIVE PERCENT: 0.8 % (ref 0–1)
BILIRUB SERPL-MCNC: 0.3 MG/DL (ref 0–1)
BUN BLDV-MCNC: 18 MG/DL (ref 6–23)
CALCIUM SERPL-MCNC: 9.4 MG/DL (ref 8.3–10.6)
CHLORIDE BLD-SCNC: 104 MMOL/L (ref 99–110)
CHOLESTEROL, FASTING: 241 MG/DL
CO2: 26 MMOL/L (ref 21–32)
CREAT SERPL-MCNC: 1.1 MG/DL (ref 0.9–1.3)
CREATININE URINE: 224.9 MG/DL (ref 39–259)
DIFFERENTIAL TYPE: ABNORMAL
EOSINOPHILS ABSOLUTE: 0.3 K/CU MM
EOSINOPHILS RELATIVE PERCENT: 4.3 % (ref 0–3)
GFR AFRICAN AMERICAN: >60 ML/MIN/1.73M2
GFR NON-AFRICAN AMERICAN: >60 ML/MIN/1.73M2
GLUCOSE FASTING: 95 MG/DL (ref 70–99)
HCT VFR BLD CALC: 43.7 % (ref 42–52)
HDLC SERPL-MCNC: 38 MG/DL
HEMOGLOBIN: 14.7 GM/DL (ref 13.5–18)
IMMATURE NEUTROPHIL %: 0.3 % (ref 0–0.43)
LDL CHOLESTEROL CALCULATED: 170 MG/DL
LYMPHOCYTES ABSOLUTE: 1.6 K/CU MM
LYMPHOCYTES RELATIVE PERCENT: 23.9 % (ref 24–44)
MCH RBC QN AUTO: 30.1 PG (ref 27–31)
MCHC RBC AUTO-ENTMCNC: 33.6 % (ref 32–36)
MCV RBC AUTO: 89.5 FL (ref 78–100)
MICROALBUMIN/CREAT 24H UR: 2.7 MG/DL
MICROALBUMIN/CREAT UR-RTO: 12 MG/G CREAT (ref 0–30)
MONOCYTES ABSOLUTE: 0.7 K/CU MM
MONOCYTES RELATIVE PERCENT: 9.9 % (ref 0–4)
PDW BLD-RTO: 13.1 % (ref 11.7–14.9)
PLATELET # BLD: 332 K/CU MM (ref 140–440)
PMV BLD AUTO: 9.3 FL (ref 7.5–11.1)
POTASSIUM SERPL-SCNC: 4.4 MMOL/L (ref 3.5–5.1)
PROSTATE SPECIFIC ANTIGEN: 1.2 NG/ML (ref 0–4)
RBC # BLD: 4.88 M/CU MM (ref 4.6–6.2)
SEGMENTED NEUTROPHILS ABSOLUTE COUNT: 4 K/CU MM
SEGMENTED NEUTROPHILS RELATIVE PERCENT: 60.8 % (ref 36–66)
SODIUM BLD-SCNC: 142 MMOL/L (ref 135–145)
TOTAL IMMATURE NEUTOROPHIL: 0.02 K/CU MM
TOTAL PROTEIN: 7.3 GM/DL (ref 6.4–8.2)
TRIGLYCERIDE, FASTING: 163 MG/DL
WBC # BLD: 6.6 K/CU MM (ref 4–10.5)

## 2022-08-10 PROCEDURE — 80061 LIPID PANEL: CPT

## 2022-08-10 PROCEDURE — 80053 COMPREHEN METABOLIC PANEL: CPT

## 2022-08-10 PROCEDURE — 36415 COLL VENOUS BLD VENIPUNCTURE: CPT

## 2022-08-10 PROCEDURE — 85025 COMPLETE CBC W/AUTO DIFF WBC: CPT

## 2022-08-10 PROCEDURE — 82043 UR ALBUMIN QUANTITATIVE: CPT

## 2022-08-10 PROCEDURE — 82570 ASSAY OF URINE CREATININE: CPT

## 2022-08-10 PROCEDURE — G0103 PSA SCREENING: HCPCS

## 2022-08-16 ENCOUNTER — OFFICE VISIT (OUTPATIENT)
Dept: INTERNAL MEDICINE CLINIC | Age: 62
End: 2022-08-16
Payer: MEDICARE

## 2022-08-16 ENCOUNTER — HOSPITAL ENCOUNTER (OUTPATIENT)
Dept: GENERAL RADIOLOGY | Age: 62
Discharge: HOME | End: 2022-08-16
Payer: MEDICARE

## 2022-08-16 ENCOUNTER — HOSPITAL ENCOUNTER (OUTPATIENT)
Age: 62
Discharge: HOME | End: 2022-08-16
Payer: MEDICARE

## 2022-08-16 VITALS
RESPIRATION RATE: 16 BRPM | WEIGHT: 163.6 LBS | TEMPERATURE: 97.1 F | DIASTOLIC BLOOD PRESSURE: 80 MMHG | SYSTOLIC BLOOD PRESSURE: 122 MMHG | OXYGEN SATURATION: 98 % | BODY MASS INDEX: 28.08 KG/M2 | HEART RATE: 72 BPM

## 2022-08-16 DIAGNOSIS — I71.21 ASCENDING AORTIC ANEURYSM: ICD-10-CM

## 2022-08-16 DIAGNOSIS — F41.9 ANXIETY: ICD-10-CM

## 2022-08-16 DIAGNOSIS — M25.571 CHRONIC PAIN OF RIGHT ANKLE: ICD-10-CM

## 2022-08-16 DIAGNOSIS — G89.29 CHRONIC MIDLINE LOW BACK PAIN WITHOUT SCIATICA: ICD-10-CM

## 2022-08-16 DIAGNOSIS — G89.29 CHRONIC PAIN OF RIGHT ANKLE: ICD-10-CM

## 2022-08-16 DIAGNOSIS — E78.2 MIXED HYPERLIPIDEMIA: ICD-10-CM

## 2022-08-16 DIAGNOSIS — M54.50 CHRONIC MIDLINE LOW BACK PAIN WITHOUT SCIATICA: ICD-10-CM

## 2022-08-16 DIAGNOSIS — E11.9 TYPE 2 DIABETES MELLITUS WITHOUT COMPLICATION, WITHOUT LONG-TERM CURRENT USE OF INSULIN (HCC): Primary | ICD-10-CM

## 2022-08-16 DIAGNOSIS — I10 ESSENTIAL HYPERTENSION: ICD-10-CM

## 2022-08-16 DIAGNOSIS — M19.90 OSTEOARTHRITIS, UNSPECIFIED OSTEOARTHRITIS TYPE, UNSPECIFIED SITE: ICD-10-CM

## 2022-08-16 PROCEDURE — 99214 OFFICE O/P EST MOD 30 MIN: CPT | Performed by: INTERNAL MEDICINE

## 2022-08-16 PROCEDURE — 73610 X-RAY EXAM OF ANKLE: CPT

## 2022-08-16 PROCEDURE — 3044F HG A1C LEVEL LT 7.0%: CPT | Performed by: INTERNAL MEDICINE

## 2022-08-16 RX ORDER — GLIPIZIDE 2.5 MG/1
2.5 TABLET, EXTENDED RELEASE ORAL
Qty: 180 TABLET | Refills: 1 | Status: SHIPPED | OUTPATIENT
Start: 2022-08-16

## 2022-08-16 ASSESSMENT — PATIENT HEALTH QUESTIONNAIRE - PHQ9
1. LITTLE INTEREST OR PLEASURE IN DOING THINGS: 0
SUM OF ALL RESPONSES TO PHQ QUESTIONS 1-9: 0

## 2022-08-16 NOTE — PROGRESS NOTES
some time since Covid 19 started   Assessment & Plan:    : Patient is stable. Continue current treatment. 4. Mixed hyperlipidemia  Overview:  Hyperlipidemia in good control  On atorvastatin 40 mg daily  Patient is stable. Continue current treatment. Follow diet. Assessment & Plan:    : lipid profile increased. Pt states he was not taking atorvastatin. Now started again after seeing the lab results   5. Osteoarthritis, unspecified osteoarthritis type, unspecified site  Overview:  ARTHRITIC pain in spine, hip and feet per pt.used to  sees Dr. Ronda Moser  Has arthritis of different joints. But is bearable. Not taking any med  [facility] [place] [facility] Josh Connell. He told him cannot do much to him. Assessment & Plan:   . Pt is not taking any medication  Rarely takes aleve. Has pain in the right ankle   6. Ascending aortic aneurysm [facility])  Overview:  8/16: CT chest :Mild aneurysmal dilation of the ascending aorta measuring up to 4.1 cm.  9/16: stable. Ectasia 4.1 cm  9/17: stable. Ectasia 4.1 cm  10/2018 : 4.1 cm   Recheck in one year. Refused : financial reason. 7/2020. Ascending aortic ectasia at 4 cm : stable   Seen Dr Breezy Michelle for ascending aortic aneurysm in 8/2021   He ordered CT chest and  in one year   Assessment & Plan:   : Advised pt to check with him. 7. Chronic midline low back pain without sciatica  Overview:  Patient states he has lower back pain for a few months  Not taking any medications  Advised patient to take Tylenol and over-the-counter creams  If not improved needs to get work-up done. Assessment & Plan:    Pain improved. X ray of right ankle for chronic right ankle pain.     Preventive medicine discussed with the patient  He refused COVID-vaccine  Refused pneumococcal vaccine: As he believes to have mixed COVID-vaccine in the pneumonia vaccine    Patient states he has a Cologuard at home he has not done it yet    Advised patient to see eye doctor for retinal examination    Return to office in 3 months. Mediations reviewed with the patient. Continue current medications. Appropriate prescriptions are addressed. After visit summeryprovided. Follow up as directed sooner if needed. Questions answered and patient verbalizes understanding. Call for any problems, questions, or concerns. Allergies   Allergen Reactions    Etodolac Other (See Comments)     Drop in bp     Efudex [Fluorouracil]      Current Outpatient Medications   Medication Sig Dispense Refill    JANUMET  MG per tablet TAKE ONE TABLET BY MOUTH TWICE DAILY with meals 60 tablet 5    carvedilol (COREG) 12.5 MG tablet TAKE ONE TABLET BY MOUTH TWICE DAILY with meals 180 tablet 1    atorvastatin (LIPITOR) 40 MG tablet Take 1 tablet by mouth daily 90 tablet 1    benazepril (LOTENSIN) 20 MG tablet Take 1 tablet by mouth daily 90 tablet 1    amLODIPine (NORVASC) 10 MG tablet TAKE ONE TABLET BY MOUTH DAILY 90 tablet 1    glipiZIDE (GLUCOTROL XL) 2.5 MG extended release tablet Take 1 tablet by mouth 2 times daily (before meals) 60 tablet 5    Easy Touch Lancets 33G/Twist MISC USE TO test blood sugar EVERY DAY AS NEEDED 100 each 1    Blood Glucose Monitoring Suppl ([employer] GLUC MON SYS) w/Device KIT USE TO test blood sugar EVERY DAY AS NEEDED 1 kit 1    Alcohol Swabs (ALCOHOL PREP) 70 % PADS USE TO test blood sugar EVERY DAY AS NEEDED 100 each 1    aspirin 81 MG tablet Take 81 mg by mouth daily. tetanus-diphth-acell pertussis (BOOSTRIX) 5-2.5-18.5 LF-MCG/0.5 SAMI injection Boostrix Tdap 2.5 Lf unit-8 mcg-5 Lf/0.5 mL intramuscular syringe      [employer] GLUCOSE TEST strip USE TO test blood sugar EVERY DAY AS NEEDED 50 strip 1     No current facility-administered medications for this visit. Past Medical History:   Diagnosis Date    Adhesive capsulitis of shoulder 4/23/2011    Anxiety     h/o anxiety workman comp related.  sees a psychologist    Ascending aortic aneurysm ([facility] [address] 9/9/2016 : CT chest :Mild aneurysmal dilation of the ascending aorta measuring up to 4.1 cm. Recheck in one year    Backache, unspecified 2013    Sees dr. Adryan Turner    Chronic back pain     Colonoscopy refused     Depression     Diabetes mellitus [facility])     ED (erectile dysfunction) 2014    H/O CHF     resolved  EF 25-30 % improved 60% in 3/05    H/O echocardiogram     EF 60 %    Hyperlipidemia     Hypertension     Legionella pneumonia ([address] 10/27/2016    10/16. Resolved. Seen Stacy Barakat. Limitation due to disability     due to anxiety and agrophobia    Liver dysfunction     blood w/u neg. US fatty liver    Liver dysfunction 10/27/2016    Liver test were abnormal with legionelle pneumonia Now back to normal.    Mixed hyperlipidemia     Hyperlipidemia in good control On atorvastatin 40 mg daily Patient is stable. Continue current treatment. Follow diet. Multiple lung nodules on CT 2016    Small 2-4 mm nodules on CT in 10/16. F/u in one year. Seen Dr Stacy Barakat.     Osteoarthritis     spine, hip and foot per pt. sees Dr. Yahaira Cool RW    Perirectal abscess     resovled     Past Surgical History:   Procedure Laterality Date    APPENDECTOMY      CHOLECYSTECTOMY, LAPAROSCOPIC      GALLBLADDER SURGERY      PERICARDIUM SURGERY      TONSILLECTOMY      WRIST FRACTURE SURGERY       Social History     Tobacco Use    Smoking status: Former     Packs/day: 0.10     Years: 2.00     Pack years: 0.20     Types: Cigarettes     Quit date: 1983     Years since quittin.6    Smokeless tobacco: Never   Substance Use Topics    Alcohol use: No     Alcohol/week: 0.0 standard drinks       LAB REVIEW:  CBC:   Lab Results   Component Value Date/Time    WBC 6.6 08/10/2022 11:40 AM    HGB 14.7 08/10/2022 11:40 AM    HCT 43.7 08/10/2022 11:40 AM     08/10/2022 11:40 AM     Lipids:   Lab Results   Component Value Date    HDL 38 (L) 08/10/2022    LDLCALC 170 (H) 08/10/2022    LDLDIRECT 90 2021 TRIGLYCFAST 163 (H) 08/10/2022    CHOLFAST 241 (H) 08/10/2022     Renal:   Lab Results   Component Value Date/Time    BUN 18 08/10/2022 11:40 AM    CREATININE 1.1 08/10/2022 11:40 AM     08/10/2022 11:40 AM    K 4.4 08/10/2022 11:40 AM    ALT 21 08/10/2022 11:40 AM    AST 19 08/10/2022 11:40 AM    GLUCOSE 398 11/03/2021 02:31 PM    GLUF 95 08/10/2022 11:40 AM     PT/INR:   Lab Results   Component Value Date/Time    INR 1.29 10/10/2016 02:18 PM     A1C:   Lab Results   Component Value Date    LABA1C 6.5 05/16/2022           Vance Barfield MD, 8/16/2022 , 8:33 AM

## 2022-08-28 ENCOUNTER — HOSPITAL ENCOUNTER (OUTPATIENT)
Dept: CT IMAGING | Age: 62
Discharge: HOME | End: 2022-08-28
Payer: MEDICARE

## 2022-08-28 DIAGNOSIS — I71.21 ASCENDING AORTIC ANEURYSM: ICD-10-CM

## 2022-08-28 PROCEDURE — 71250 CT THORAX DX C-: CPT

## 2022-09-10 RX ORDER — CARVEDILOL 12.5 MG/1
TABLET ORAL
Qty: 180 TABLET | Refills: 1 | OUTPATIENT
Start: 2022-09-10

## 2022-09-10 RX ORDER — BENAZEPRIL HYDROCHLORIDE 20 MG/1
TABLET ORAL
Qty: 90 TABLET | Refills: 1 | OUTPATIENT
Start: 2022-09-10

## 2022-09-10 RX ORDER — AMLODIPINE BESYLATE 10 MG/1
TABLET ORAL
Qty: 90 TABLET | Refills: 1 | OUTPATIENT
Start: 2022-09-10

## 2022-09-21 ENCOUNTER — TELEPHONE (OUTPATIENT)
Dept: INTERNAL MEDICINE CLINIC | Age: 62
End: 2022-09-21

## 2022-09-21 NOTE — TELEPHONE ENCOUNTER
Patient called into the office to request samples of Janumet, but we do not currently have samples in his prescribed dose of  mg. We do have the  mg and 100-1,000 mg . Patient states that he has been previously prescribed Metformin which is covered by his insurance.  Please advise regarding samples or change in medication

## 2022-09-29 ENCOUNTER — COMMUNITY OUTREACH (OUTPATIENT)
Dept: INTERNAL MEDICINE CLINIC | Age: 62
End: 2022-09-29

## 2022-09-29 NOTE — TELEPHONE ENCOUNTER
Inform patient he can take 100-1000 mg Janumet sample once a day in the morning with breakfast   He needs to take 1000 mg of metformin separate once a day with the evening meal

## 2022-10-04 ENCOUNTER — TELEPHONE (OUTPATIENT)
Dept: INTERNAL MEDICINE CLINIC | Age: 62
End: 2022-10-04

## 2022-10-04 NOTE — TELEPHONE ENCOUNTER
Spoke with patient and he is taking Janumet 100-1000 mg in am and Metformin 1000 mg with evening meal and 'so far no side-effects\".

## 2022-10-04 NOTE — TELEPHONE ENCOUNTER
----- Message from Lam Pratt sent at 9/28/2022 12:24 PM EDT -----  Subject: Medication Problem    Medication: JANUMET  MG per tablet  Dosage: 2 a day  Ordering Provider: Adryan Chaidez     Question/Problem: Patient called into the office to request samples of   Janumet, but we do not currently have samples in his prescribed dose of    mg. We do have the  mg and 100-1,000 mg . Patient states   that he has been previously prescribed Metformin which is c  Additional Information for Provider: This medication is to expensive &   would like to be put on Metformin till next year. Patient has been without   medication since 9/20/2022.     Pharmacy: [id number] [facility], [address] [place] [address] Drive [phone] Tiago Coffey [phone]    ---------------------------------------------------------------------------  --------------  Kandi Fried INFO  [phone]; OK to leave message on voicemail  ---------------------------------------------------------------------------  --------------    SCRIPT ANSWERS  Relationship to Patient: Self

## 2022-11-04 ENCOUNTER — TELEMEDICINE (OUTPATIENT)
Dept: INTERNAL MEDICINE CLINIC | Age: 62
End: 2022-11-04
Payer: MEDICARE

## 2022-11-04 DIAGNOSIS — Z00.00 MEDICARE ANNUAL WELLNESS VISIT, SUBSEQUENT: Primary | ICD-10-CM

## 2022-11-04 PROCEDURE — G0439 PPPS, SUBSEQ VISIT: HCPCS | Performed by: INTERNAL MEDICINE

## 2022-11-04 SDOH — ECONOMIC STABILITY: FOOD INSECURITY: WITHIN THE PAST 12 MONTHS, THE FOOD YOU BOUGHT JUST DIDN'T LAST AND YOU DIDN'T HAVE MONEY TO GET MORE.: NEVER TRUE

## 2022-11-04 SDOH — ECONOMIC STABILITY: FOOD INSECURITY: WITHIN THE PAST 12 MONTHS, YOU WORRIED THAT YOUR FOOD WOULD RUN OUT BEFORE YOU GOT MONEY TO BUY MORE.: NEVER TRUE

## 2022-11-04 ASSESSMENT — PATIENT HEALTH QUESTIONNAIRE - PHQ9
SUM OF ALL RESPONSES TO PHQ QUESTIONS 1-9: 1
1. LITTLE INTEREST OR PLEASURE IN DOING THINGS: 0
SUM OF ALL RESPONSES TO PHQ QUESTIONS 1-9: 1
2. FEELING DOWN, DEPRESSED OR HOPELESS: 1
SUM OF ALL RESPONSES TO PHQ9 QUESTIONS 1 & 2: 1
SUM OF ALL RESPONSES TO PHQ QUESTIONS 1-9: 1
SUM OF ALL RESPONSES TO PHQ QUESTIONS 1-9: 1

## 2022-11-04 ASSESSMENT — LIFESTYLE VARIABLES
HOW OFTEN DO YOU HAVE A DRINK CONTAINING ALCOHOL: NEVER
HOW MANY STANDARD DRINKS CONTAINING ALCOHOL DO YOU HAVE ON A TYPICAL DAY: PATIENT DOES NOT DRINK

## 2022-11-04 ASSESSMENT — SOCIAL DETERMINANTS OF HEALTH (SDOH): HOW HARD IS IT FOR YOU TO PAY FOR THE VERY BASICS LIKE FOOD, HOUSING, MEDICAL CARE, AND HEATING?: SOMEWHAT HARD

## 2022-11-04 NOTE — PROGRESS NOTES
Medicare Annual Wellness Visit    Sharyle Chapman is here for Medicare AWV    Assessment & Plan   Medicare annual wellness visit, subsequent    Recommendations for Preventive Services Due: see orders and patient instructions/AVS.  Recommended screening schedule for the next 5-10 years is provided to the patient in written form: see Patient Instructions/AVS.     No follow-ups on file. Subjective       Patient's complete Health Risk Assessment and screening values have been reviewed and are found in Flowsheets. The following problems were reviewed today and where indicated follow up appointments were made and/or referrals ordered.     Positive Risk Factor Screenings with Interventions:    Fall Risk:  Do you feel unsteady or are you worried about falling? : no  2 or more falls in past year?: (!) yes (slipped coming down stairs, other fall he tripped-has right foot pain that makes it difficult at times walking, just had xray)  Fall with injury in past year?: no   Fall Risk Interventions:    Patient declines any further evaluation/treatment for this issue  Patient states he has right foot pain that makes it difficult at times walking, states he just had an xray that was negative-patient has office visit with PCP 11/16/22            General Health and ACP:  General  In general, how would you say your health is?: Fair  In the past 7 days, have you experienced any of the following: New or Increased Pain, New or Increased Fatigue, Loneliness, Social Isolation, Stress or Anger?: (!) Yes  Select all that apply: (!) Stress, Anger (everyday life stress, will cause anger-politics,disabled wife and special needs grandson)  Do you get the social and emotional support that you need?: Yes  Do you have a Living Will?: Yes    Advance Directives       Power of  Living Will ACP-Advance Directive ACP-Power of     Not on File Not on File Not on File Not on File          General Health Risk Interventions:  Stress: patient's comments regarding reasons for stress and/or anger: patient states life stress, takes care of his disabled wife and special needs grandson  Anger: patient's comments regarding reasons for stress and/or anger: states he will get angry due to politics  No Living Will: ACP documents already completed- patient asked to provide copy to the office    Health Habits/Nutrition:  Physical Activity: Inactive    Days of Exercise per Week: 0 days    Minutes of Exercise per Session: 0 min     Have you lost any weight without trying in the past 3 months?: No     Have you seen the dentist within the past year?: (!) No (no teeth-lost his dentures-having difficulty with ins now)  Health Habits/Nutrition Interventions:  Inadequate physical activity:  patient is not ready to increase his/her physical activity level at this time  Nutritional issues:  patient is not ready to address his/her nutritional/weight issues at this time  Dental exam overdue:  patient states he has no teeth and lost his dentures. States he is having difficulty now with his insurance on wanting to pay for his oral care. Hearing/Vision:  Do you or your family notice any trouble with your hearing that hasn't been managed with hearing aids?: No  Do you have difficulty driving, watching TV, or doing any of your daily activities because of your eyesight?: No  Have you had an eye exam within the past year?: (!) No (every other)  No results found.   Hearing/Vision Interventions:  Vision concerns:  patient encouraged to make appointment with his/her eye specialist, patient states he gets an eye exam every other year    Safety:  Do you have working smoke detectors?: Yes  Do you have any tripping hazards - loose or unsecured carpets or rugs?: No  Do you have any tripping hazards - clutter in doorways, halls, or stairs?: No  Do you have either shower bars, grab bars, non-slip mats or non-slip surfaces in your shower or bathtub?: Yes  Do all of your stairways have a railing or banister?: (!) No  Do you always fasten your seatbelt when you are in a car?: Yes  Safety Interventions:  Home safety tips provided verbally           Objective      Patient-Reported Vitals  No data recorded      Unable to obtain 3 vital signs due to patient not having equipment to take blood pressure/temperature. Allergies   Allergen Reactions    Etodolac Other (See Comments)     Drop in bp     Efudex [Fluorouracil]      Prior to Visit Medications    Medication Sig Taking? Authorizing Provider   metFORMIN (GLUCOPHAGE) 1000 MG tablet Take metformin 1000 mg with evening meal Yes Annalise Shields MD   glipiZIDE (GLUCOTROL XL) 2.5 MG extended release tablet Take 1 tablet by mouth in the morning and 1 tablet in the evening. Take before meals. Yes Annalise Shields MD   carvedilol (COREG) 12.5 MG tablet TAKE ONE TABLET BY MOUTH TWICE DAILY with meals Yes Annalise Shields MD   atorvastatin (LIPITOR) 40 MG tablet Take 1 tablet by mouth daily Yes Annalise Shields MD   benazepril (LOTENSIN) 20 MG tablet Take 1 tablet by mouth daily Yes Annalise Shields MD   amLODIPine (NORVASC) 10 MG tablet TAKE ONE TABLET BY MOUTH DAILY Yes Annalise Shields MD   Easy Touch Lancets 33G/Twist MISC USE TO test blood sugar EVERY DAY AS NEEDED Yes Annalise Shields MD   Blood Glucose Monitoring Suppl ([facility] GLUC MON SYS) w/Device KIT USE TO test blood sugar EVERY DAY AS NEEDED Yes Annalise Shields MD   Alcohol Swabs (ALCOHOL PREP) 70 % PADS USE TO test blood sugar EVERY DAY AS NEEDED Yes Annalise Shields MD   [facility] GLUCOSE TEST strip USE TO test blood sugar EVERY DAY AS NEEDED Yes Annalise Shields MD   aspirin 81 MG tablet Take 81 mg by mouth daily.  Yes Historical Provider, MD ROSS  MG per tablet TAKE ONE TABLET BY MOUTH TWICE DAILY with meals  Patient not taking: Reported on 11/4/2022  Annalise Shields MD   tetanus-diphth-acell pertussis (BOOSTRIX) 5-2.5-18.5 LF-MCG/0.5 SAMI injection Boostrix Tdap 2.5 Lf unit-8 mcg-5 Lf/0.5 mL intramuscular syringe  Historical Provider, MD [place] (Including outside providers/suppliers regularly involved in providing care):   Patient Care Team:  Timothy Troncoso MD as PCP - Quynh Turner MD as PCP - [facility] Centerled Provider     Reviewed and updated this visit:  Allergies  Meds             I, Kristi Aolnso LPN, 26/4/8012, performed the documented evaluation under the direct supervision of the attending physician. Sharyle Chapman, was evaluated through a synchronous (real-time) audio encounter. The patient (or guardian if applicable) is aware that this is a billable service, which includes applicable co-pays. This Virtual Visit was conducted with patient's (and/or legal guardian's) consent. The visit was conducted pursuant to the emergency declaration under the [address] [place] [address], [facility] waiver authority and the [employer] and [employer] General Act. Patient identification was verified, and a caregiver was present when appropriate. The patient was located at Home: [address] [place] [address]. Provider was located at [facility] (Appt Dept): [address] [facility] [address] [place] [address],  [address] [place] [address]. Total time spent for this encounter: Not billed by time    --Kristi Alonso LPN on 93/4/1358 at 1:35 AM    An electronic signature was used to authenticate this note.

## 2022-11-04 NOTE — PATIENT INSTRUCTIONS
Personalized Preventive Plan for [place] - 11/4/2022  Medicare offers a range of preventive health benefits. Some of the tests and screenings are paid in full while other may be subject to a deductible, co-insurance, and/or copay. Some of these benefits include a comprehensive review of your medical history including lifestyle, illnesses that may run in your family, and various assessments and screenings as appropriate. After reviewing your medical record and screening and assessments performed today your provider may have ordered immunizations, labs, imaging, and/or referrals for you. A list of these orders (if applicable) as well as your Preventive Care list are included within your After Visit Summary for your review. Other Preventive Recommendations:    A preventive eye exam performed by an eye specialist is recommended every 1-2 years to screen for glaucoma; cataracts, macular degeneration, and other eye disorders. A preventive dental visit is recommended every 6 months. Try to get at least 150 minutes of exercise per week or 10,000 steps per day on a pedometer . Order or download the FREE \"Exercise & Physical Activity: Your Everyday Guide\" from [employer]. Call 1-131.972.7661 or search [employer] online. You need 7875-2048 mg of calcium and 7947-2744 IU of vitamin D per day. It is possible to meet your calcium requirement with diet alone, but a vitamin D supplement is usually necessary to meet this goal.  When exposed to the sun, use a sunscreen that protects against both UVA and UVB radiation with an SPF of 30 or greater. Reapply every 2 to 3 hours or after sweating, drying off with a towel, or swimming. Always wear a seat belt when traveling in a car. Always wear a helmet when riding a bicycle or motorcycle.

## 2022-12-12 DIAGNOSIS — E78.2 MIXED HYPERLIPIDEMIA: ICD-10-CM

## 2022-12-12 DIAGNOSIS — E11.9 TYPE 2 DIABETES MELLITUS WITHOUT COMPLICATION, WITHOUT LONG-TERM CURRENT USE OF INSULIN (HCC): ICD-10-CM

## 2022-12-12 RX ORDER — GLIPIZIDE 2.5 MG/1
TABLET, EXTENDED RELEASE ORAL
Qty: 180 TABLET | Refills: 1 | Status: SHIPPED | OUTPATIENT
Start: 2022-12-12

## 2022-12-12 RX ORDER — BENAZEPRIL HYDROCHLORIDE 20 MG/1
20 TABLET ORAL DAILY
Qty: 90 TABLET | Refills: 1 | Status: SHIPPED | OUTPATIENT
Start: 2022-12-12

## 2022-12-12 RX ORDER — CARVEDILOL 12.5 MG/1
TABLET ORAL
Qty: 180 TABLET | Refills: 1 | Status: SHIPPED | OUTPATIENT
Start: 2022-12-12

## 2022-12-12 RX ORDER — ATORVASTATIN CALCIUM 40 MG/1
40 TABLET, FILM COATED ORAL DAILY
Qty: 90 TABLET | Refills: 1 | Status: SHIPPED | OUTPATIENT
Start: 2022-12-12

## 2022-12-12 RX ORDER — AMLODIPINE BESYLATE 10 MG/1
TABLET ORAL
Qty: 90 TABLET | Refills: 1 | Status: SHIPPED | OUTPATIENT
Start: 2022-12-12

## 2022-12-12 NOTE — TELEPHONE ENCOUNTER
Patient contacted office to request refills. He reports he needs all of his medications sent. Pharmacy only sent a request for one medication. Please contact patient to verify medications. Patient has to cancel his upcoming appointment due to being out of the office.

## 2023-01-13 ENCOUNTER — OFFICE VISIT (OUTPATIENT)
Dept: INTERNAL MEDICINE CLINIC | Age: 63
End: 2023-01-13
Payer: MEDICARE

## 2023-01-13 VITALS
TEMPERATURE: 97.1 F | OXYGEN SATURATION: 96 % | WEIGHT: 173.4 LBS | HEIGHT: 64 IN | HEART RATE: 68 BPM | SYSTOLIC BLOOD PRESSURE: 132 MMHG | BODY MASS INDEX: 29.6 KG/M2 | DIASTOLIC BLOOD PRESSURE: 68 MMHG | RESPIRATION RATE: 16 BRPM

## 2023-01-13 DIAGNOSIS — E78.2 MIXED HYPERLIPIDEMIA: ICD-10-CM

## 2023-01-13 DIAGNOSIS — M19.90 OSTEOARTHRITIS, UNSPECIFIED OSTEOARTHRITIS TYPE, UNSPECIFIED SITE: ICD-10-CM

## 2023-01-13 DIAGNOSIS — G89.29 CHRONIC MIDLINE LOW BACK PAIN WITHOUT SCIATICA: ICD-10-CM

## 2023-01-13 DIAGNOSIS — R91.8 MULTIPLE LUNG NODULES ON CT: ICD-10-CM

## 2023-01-13 DIAGNOSIS — I10 ESSENTIAL HYPERTENSION: ICD-10-CM

## 2023-01-13 DIAGNOSIS — F41.9 ANXIETY: ICD-10-CM

## 2023-01-13 DIAGNOSIS — I71.21 ANEURYSM OF ASCENDING AORTA WITHOUT RUPTURE: ICD-10-CM

## 2023-01-13 DIAGNOSIS — M54.50 CHRONIC MIDLINE LOW BACK PAIN WITHOUT SCIATICA: ICD-10-CM

## 2023-01-13 DIAGNOSIS — E11.9 TYPE 2 DIABETES MELLITUS WITHOUT COMPLICATION, WITHOUT LONG-TERM CURRENT USE OF INSULIN (HCC): Primary | ICD-10-CM

## 2023-01-13 LAB — HBA1C MFR BLD: 10.3 %

## 2023-01-13 PROCEDURE — 3074F SYST BP LT 130 MM HG: CPT | Performed by: INTERNAL MEDICINE

## 2023-01-13 PROCEDURE — 3078F DIAST BP <80 MM HG: CPT | Performed by: INTERNAL MEDICINE

## 2023-01-13 PROCEDURE — 3046F HEMOGLOBIN A1C LEVEL >9.0%: CPT | Performed by: INTERNAL MEDICINE

## 2023-01-13 PROCEDURE — 83036 HEMOGLOBIN GLYCOSYLATED A1C: CPT | Performed by: INTERNAL MEDICINE

## 2023-01-13 PROCEDURE — 99214 OFFICE O/P EST MOD 30 MIN: CPT | Performed by: INTERNAL MEDICINE

## 2023-01-13 RX ORDER — SITAGLIPTIN AND METFORMIN HYDROCHLORIDE 1000; 50 MG/1; MG/1
TABLET, FILM COATED ORAL
Qty: 180 TABLET | Refills: 1 | Status: SHIPPED | OUTPATIENT
Start: 2023-01-13

## 2023-01-13 ASSESSMENT — PATIENT HEALTH QUESTIONNAIRE - PHQ9
SUM OF ALL RESPONSES TO PHQ QUESTIONS 1-9: 1
2. FEELING DOWN, DEPRESSED OR HOPELESS: 1
SUM OF ALL RESPONSES TO PHQ QUESTIONS 1-9: 1
1. LITTLE INTEREST OR PLEASURE IN DOING THINGS: 0
SUM OF ALL RESPONSES TO PHQ9 QUESTIONS 1 & 2: 1

## 2023-01-13 NOTE — PROGRESS NOTES
Camillo Hodgkins  Patient's  is 1960  Seen in office on 2023      SUBJECTIVE:  Mamae Simon sonia 60 y. o.year old male presents today   Chief Complaint   Patient presents with    Diabetes     A1c=10.3  Wants to restart Janumet    Medication Refill    Other     Moving to [facility] on 2023         Pt is moving to [place]  Pt is here for f/u of DM,HTN, HLD  Patient has DM. No hypoglycemia. No numbness or weakness. No dizziness. Pt was not checking blood sugar at home. He was not taking Janumet and was taking metformin and glipizide. Few months ago patient stated he cannot afford Janumet therefore he was given some samples of Janumet 100/1000 mg and patient was taking Janumet in the morning and metformin 1000 mg in the evening. He ran out of [employer]. Now he is taking only metformin 1000 mg at day and glipizide 2.5 mg twice a day. His hemoglobin A1c has increased to 10.3. Patient has hypertension. Taking medications No headaches, no chest pain, no palpitations and no dizziness. Patient has hyperlipidemia. Taking medications. No abdominal pain, no nausea or vomiting. No myalgias. Patient states his anxiety has improved a lot. He is able to drive car. He stopped going to counseling. Taking medications regularly. No side effects noted. Review of Systems   Constitutional: Negative. Negative for chills, diaphoresis and fever. HENT: Negative. Eyes: Negative. Respiratory: Negative. Cardiovascular: Negative. Gastrointestinal: Negative. Endocrine: Negative. Negative for polydipsia and polyphagia. Genitourinary: Negative. Musculoskeletal: Negative. Negative for back pain and neck pain. Skin: Negative. Allergic/Immunologic: Negative. Neurological: Negative. Negative for dizziness and facial asymmetry. Hematological: Negative. Psychiatric/Behavioral: Negative.        OBJECTIVE: /68   Pulse 68   Temp 97.1 °F (36.2 °C) (Temporal)   Resp 16   Ht 5' 4\" (1.626 m) Wt 173 lb 6.4 oz (78.7 kg)   SpO2 96%   BMI 29.76 kg/m²     Wt Readings from Last 3 Encounters:   01/13/23 173 lb 6.4 oz (78.7 kg)   08/16/22 163 lb 9.6 oz (74.2 kg)   05/16/22 164 lb (74.4 kg)      GENERAL: - Alert, oriented, pleasant, in no apparent distress. HEENT: - Conjunctiva pink, no scleral icterus. ENT clear. NECK: -Supple. No jugular venous distention noted. No masses felt,  CARDIOVASCULAR: - Normal S1 and S2    PULMONARY: - No respiratory distress. No wheezes or rales. ABDOMEN: - Soft and non-tender,no masses  ororganomegaly. EXTREMITIES: - No cyanosis, clubbing, or significant edema. SKIN: Skin is warm and dry. NEUROLOGICAL: - Cranial nerves II through XII are grossly intact. IMPRESSION:    Encounter Diagnoses   Name Primary? Type 2 diabetes mellitus without complication, without long-term current use of insulin (HCC) Yes    Essential hypertension     Anxiety     Mixed hyperlipidemia     Osteoarthritis, unspecified osteoarthritis type, unspecified site     Aneurysm of ascending aorta without rupture     Multiple lung nodules on CT     Chronic midline low back pain without sciatica        ASSESSMENT/PLAN:    Refused any other blood test Lipid profile, /cbc or CMP  Refused vaccinations including pneumonia vaccine, covid, flu vaccine etc   Refused colonoscopy  Eye exam scheduled next week . 1. Type 2 diabetes mellitus without complication, without long-term current use of insulin (HCC)  Overview:  Pt has DM since ? 2007  Sees Ophthalmologist once a year  change to Janumet 50/1000 mg bid . Pt stopped taking it as it expensive. He started taking again as insurance is covering it.   Taking glipizide ER 2.5 mg twice a day  Took  Janumet 100-1000 mg ( samples) in am and Metformin 1000 mg with evening meal   After he ran out of Janumet he could not afford medication  hga1c increased to 10.3    Will restart Janumet 50/1000 mg bid and glipizide 2.5 mg bid  Follow diet     Advised patient to check the blood sugars regularly     Orders:  -     POCT glycosylated hemoglobin (Hb A1C)  2. Essential hypertension  Overview:  Patient is on amlodipine, benazepril and carvedilol  Continue current treatment. 3. Anxiety  Overview:  Seen psychologist Gustavo Boudreaux . He takes a drive with pt. Pt states Dr Delano Mcdaniel has retired and he found another psychologist and is going to see her. Pt sees Mary Rollins now q 3 months prn. Has not seen her for some time since Covid 19 started   Not seen any psychologist      4. Mixed hyperlipidemia  Overview:  Hyperlipidemia in good control  On atorvastatin 40 mg daily  Patient is stable. Continue current treatment. Follow diet. 5. Osteoarthritis, unspecified osteoarthritis type, unspecified site  Overview:  ARTHRITIC pain in spine, hip and feet per pt.used to  sees Dr. Loretta Avalos  Has arthritis of different joints. But is bearable. Not taking any med  [facility] [place] [facility] Domenica Friedman. He told him cannot do much to him. Not taking any medication. Doing well  Assessment & Plan:     6. Aneurysm of ascending aorta without rupture  Overview:  8/16: CT chest :Mild aneurysmal dilation of the ascending aorta measuring up to 4.1 cm.  9/16: stable. Ectasia 4.1 cm  9/17: stable. Ectasia 4.1 cm  10/2018 : 4.1 cm   Recheck in one year. Refused : financial reason. 7/2020. Ascending aortic ectasia at 4 cm : stable   Seen Dr Grayson Starks for ascending aortic aneurysm in 8/2021 8/2022 : stable 4x4 cm and aortic root 4.2 cm  He ordered CT chest and  in one year   Assessment & Plan:     7. Multiple lung nodules on CT  Overview:  Small 2-4 mm nodules on CT in 10/16. F/u in one year. Seen Dr Kwan Key. F/u 9/2017 : stable nodules. Benign based on stability criteria. No f/u needed  CT chest 7/2020 shows a stable pulmonary nodules no need for further work-up  8.  Chronic midline low back pain without sciatica  Overview:  Patient states he has lower back pain for a few months  Not taking any medications. Tolerable     Patient moving to [place]. No follow-up appointment given         Mediations reviewed with the patient. Continue current medications. Appropriate prescriptions are addressed. After visit summeryprovided. Follow up as directed sooner if needed. Questions answered and patient verbalizes understanding. Call for any problems, questions, or concerns. Allergies   Allergen Reactions    Etodolac Other (See Comments)     Drop in bp     Efudex [Fluorouracil]      Current Outpatient Medications   Medication Sig Dispense Refill    glipiZIDE (GLUCOTROL XL) 2.5 MG extended release tablet TAKE ONE TABLET BY MOUTH EVERY MORNING AND ONE EVERY EVENING before meals 180 tablet 1    metFORMIN (GLUCOPHAGE) 1000 MG tablet Take metformin 1000 mg with evening meal 90 tablet 1    carvedilol (COREG) 12.5 MG tablet TAKE ONE TABLET BY MOUTH TWICE DAILY with meals 180 tablet 1    atorvastatin (LIPITOR) 40 MG tablet Take 1 tablet by mouth daily 90 tablet 1    benazepril (LOTENSIN) 20 MG tablet Take 1 tablet by mouth daily 90 tablet 1    amLODIPine (NORVASC) 10 MG tablet TAKE ONE TABLET BY MOUTH DAILY 90 tablet 1    aspirin 81 MG tablet Take 81 mg by mouth daily.       JANUMET  MG per tablet TAKE ONE TABLET BY MOUTH TWICE DAILY with meals (Patient not taking: Reported on 1/13/2023) 60 tablet 5    tetanus-diphth-acell pertussis (BOOSTRIX) 5-2.5-18.5 LF-MCG/0.5 SAMI injection Boostrix Tdap 2.5 Lf unit-8 mcg-5 Lf/0.5 mL intramuscular syringe      Easy Touch Lancets 33G/Twist MISC USE TO test blood sugar EVERY DAY AS NEEDED 100 each 1    Blood Glucose Monitoring Suppl (Cloud Takeoff GLUC MON SYS) w/Device KIT USE TO test blood sugar EVERY DAY AS NEEDED 1 kit 1    Alcohol Swabs (ALCOHOL PREP) 70 % PADS USE TO test blood sugar EVERY DAY AS NEEDED 100 each 1    Cloud Takeoff GLUCOSE TEST strip USE TO test blood sugar EVERY DAY AS NEEDED 50 strip 1     No current facility-administered medications for this visit. Past Medical History:   Diagnosis Date    Adhesive capsulitis of shoulder 2011    Anxiety     h/o anxiety workman comp related. sees a psychologist    Ascending aortic aneurysm 2016: CT chest :Mild aneurysmal dilation of the ascending aorta measuring up to 4.1 cm. Recheck in one year    Backache, unspecified 2013    Sees dr. Jannie House    Chronic back pain     Colonoscopy refused     Depression     Diabetes mellitus [facility])     ED (erectile dysfunction) 2014    H/O CHF     resolved  EF 25-30 % improved 60% in 3/05    H/O echocardiogram     EF 60 %    Hyperlipidemia     Hypertension     Legionella pneumonia ([facility] [address] 10/27/2016    10/16. Resolved. Seen Tanika Lewis. Limitation due to disability     due to anxiety and agrophobia    Liver dysfunction     blood w/u neg. US fatty liver    Liver dysfunction 10/27/2016    Liver test were abnormal with legionelle pneumonia Now back to normal.    Mixed hyperlipidemia     Hyperlipidemia in good control On atorvastatin 40 mg daily Patient is stable. Continue current treatment. Follow diet. Multiple lung nodules on CT 2016    Small 2-4 mm nodules on CT in 10/16. F/u in one year. Seen Dr Tanika Lewis.     Osteoarthritis     spine, hip and foot per pt. sees Dr. Price Almaraz RW    Perirectal abscess     resovled     Past Surgical History:   Procedure Laterality Date    APPENDECTOMY      CHOLECYSTECTOMY, LAPAROSCOPIC      GALLBLADDER SURGERY      PERICARDIUM SURGERY      TONSILLECTOMY      WRIST FRACTURE SURGERY       Social History     Tobacco Use    Smoking status: Former     Packs/day: 0.10     Years: 2.00     Pack years: 0.20     Types: Cigarettes     Quit date: 1983     Years since quittin.0    Smokeless tobacco: Never   Substance Use Topics    Alcohol use: No     Alcohol/week: 0.0 standard drinks       LAB REVIEW:  CBC:   Lab Results   Component Value Date/Time    WBC 6.6 08/10/2022 11:40 AM    HGB 14.7 08/10/2022 11:40 AM    HCT 43.7 08/10/2022 11:40 AM     08/10/2022 11:40 AM     Lipids:   Lab Results   Component Value Date    HDL 38 (L) 08/10/2022    LDLCALC 170 (H) 08/10/2022    LDLDIRECT 90 12/01/2021    TRIGLYCFAST 163 (H) 08/10/2022    CHOLFAST 241 (H) 08/10/2022     Renal:   Lab Results   Component Value Date/Time    BUN 18 08/10/2022 11:40 AM    CREATININE 1.1 08/10/2022 11:40 AM     08/10/2022 11:40 AM    K 4.4 08/10/2022 11:40 AM    ALT 21 08/10/2022 11:40 AM    AST 19 08/10/2022 11:40 AM    GLUCOSE 398 11/03/2021 02:31 PM    GLUF 95 08/10/2022 11:40 AM     PT/INR:   Lab Results   Component Value Date/Time    INR 1.29 10/10/2016 02:18 PM     A1C:   Lab Results   Component Value Date    LABA1C 6.5 05/16/2022           Delon Prado MD, 1/13/2023 , 11:03 AM

## 2023-01-14 ASSESSMENT — ENCOUNTER SYMPTOMS
ALLERGIC/IMMUNOLOGIC NEGATIVE: 1
BACK PAIN: 0
GASTROINTESTINAL NEGATIVE: 1
RESPIRATORY NEGATIVE: 1
EYES NEGATIVE: 1

## 2023-03-10 RX ORDER — AMLODIPINE BESYLATE 10 MG/1
TABLET ORAL
Qty: 90 TABLET | Refills: 1 | OUTPATIENT
Start: 2023-03-10

## 2023-03-10 RX ORDER — CARVEDILOL 12.5 MG/1
TABLET ORAL
Qty: 180 TABLET | Refills: 1 | OUTPATIENT
Start: 2023-03-10

## 2023-03-10 RX ORDER — BENAZEPRIL HYDROCHLORIDE 20 MG/1
20 TABLET ORAL DAILY
Qty: 90 TABLET | Refills: 1 | OUTPATIENT
Start: 2023-03-10

## 2023-06-06 ENCOUNTER — APPOINTMENT (RX ONLY)
Dept: URBAN - METROPOLITAN AREA CLINIC 70 | Facility: CLINIC | Age: 63
Setting detail: DERMATOLOGY
End: 2023-06-06

## 2023-06-06 DIAGNOSIS — L57.0 ACTINIC KERATOSIS: ICD-10-CM

## 2023-06-06 PROCEDURE — ? COUNSELING

## 2023-06-06 PROCEDURE — 17003 DESTRUCT PREMALG LES 2-14: CPT

## 2023-06-06 PROCEDURE — ? LIQUID NITROGEN

## 2023-06-06 PROCEDURE — 17000 DESTRUCT PREMALG LESION: CPT

## 2023-06-06 ASSESSMENT — LOCATION SIMPLE DESCRIPTION DERM
LOCATION SIMPLE: LEFT FOREARM
LOCATION SIMPLE: LEFT ELBOW
LOCATION SIMPLE: RIGHT FOREARM
LOCATION SIMPLE: RIGHT UPPER ARM

## 2023-06-06 ASSESSMENT — LOCATION DETAILED DESCRIPTION DERM
LOCATION DETAILED: LEFT ELBOW
LOCATION DETAILED: LEFT PROXIMAL DORSAL FOREARM
LOCATION DETAILED: RIGHT PROXIMAL DORSAL FOREARM
LOCATION DETAILED: RIGHT PROXIMAL ULNAR DORSAL FOREARM
LOCATION DETAILED: RIGHT DISTAL MEDIAL POSTERIOR UPPER ARM

## 2023-06-06 ASSESSMENT — LOCATION ZONE DERM: LOCATION ZONE: ARM

## 2023-06-06 NOTE — HPI: SKIN LESIONS
How Severe Is Your Skin Lesion?: moderate
Have Your Skin Lesions Been Treated?: not been treated
Is This A New Presentation, Or A Follow-Up?: Skin Lesions
Additional History: Spots on the arms

## 2023-09-05 ENCOUNTER — COMMUNITY OUTREACH (OUTPATIENT)
Dept: INTERNAL MEDICINE CLINIC | Age: 63
End: 2023-09-05

## 2024-02-09 RX ORDER — SITAGLIPTIN AND METFORMIN HYDROCHLORIDE 1000; 50 MG/1; MG/1
TABLET, FILM COATED ORAL
Qty: 180 TABLET | Refills: 1 | OUTPATIENT
Start: 2024-02-09

## 2025-05-28 ENCOUNTER — TELEPHONE (OUTPATIENT)
Age: 65
End: 2025-05-28

## 2025-05-28 NOTE — TELEPHONE ENCOUNTER
Patient called wanted to know if he had diastolic or systolic heart failure - advised to contact cardiologist -